# Patient Record
Sex: FEMALE | Race: WHITE
[De-identification: names, ages, dates, MRNs, and addresses within clinical notes are randomized per-mention and may not be internally consistent; named-entity substitution may affect disease eponyms.]

---

## 2018-02-23 ENCOUNTER — HOSPITAL ENCOUNTER (EMERGENCY)
Dept: HOSPITAL 26 - MED | Age: 23
Discharge: HOME | End: 2018-02-23
Payer: COMMERCIAL

## 2018-02-23 VITALS — DIASTOLIC BLOOD PRESSURE: 95 MMHG | SYSTOLIC BLOOD PRESSURE: 126 MMHG

## 2018-02-23 VITALS — BODY MASS INDEX: 26.11 KG/M2 | WEIGHT: 133 LBS | HEIGHT: 60 IN

## 2018-02-23 VITALS — SYSTOLIC BLOOD PRESSURE: 17 MMHG | DIASTOLIC BLOOD PRESSURE: 63 MMHG

## 2018-02-23 DIAGNOSIS — L25.9: Primary | ICD-10-CM

## 2018-02-23 NOTE — NUR
Patient discharged with v/s stable. Written and verbal after care instructions 
given and explained. 

Patient alert, oriented and verbalized understanding of instructions. 
Ambulatory with steady gait. All questions addressed prior to discharge. ID 
band removed. Patient advised to follow up with PMD. Rx of Hydrocortizone cream 
given. Patient educated on indication of medication including possible reaction 
and side effects. Opportunity to ask questions provided and answered.

## 2018-02-23 NOTE — NUR
PATIENT PRESENTS TO ED WITH C/O RASH ON  LT SIDE OF HER ABD;PT STATES HER 
BOYFREIND HAS A SHINGLES AND SHE JUST WANT TO BE CHECK IF SHE'S OKAY".DENIES 
N/V/D; SKIN IS PINK/WARM/DRY; AAOX4 WITH EVEN AND STEADY GAIT; LUNGS CLEAR BL; 
HR EVEN AND REGULAR; PT DENIES ANY FEVER, CP, SOB, OR COUGH AT THIS TIME; 
PATIENT STATES PAIN OF 1/10 AT THIS TIME;PATIENT POSITIONED FOR COMFORT; ER MD 
MADE AWARE OF PT STATUS.

## 2019-10-10 ENCOUNTER — HOSPITAL ENCOUNTER (EMERGENCY)
Dept: HOSPITAL 26 - MED | Age: 24
Discharge: HOME | End: 2019-10-10
Payer: COMMERCIAL

## 2019-10-10 VITALS — SYSTOLIC BLOOD PRESSURE: 137 MMHG | DIASTOLIC BLOOD PRESSURE: 81 MMHG

## 2019-10-10 VITALS — HEIGHT: 60 IN | WEIGHT: 140 LBS | BODY MASS INDEX: 27.48 KG/M2

## 2019-10-10 VITALS — DIASTOLIC BLOOD PRESSURE: 95 MMHG | SYSTOLIC BLOOD PRESSURE: 137 MMHG

## 2019-10-10 DIAGNOSIS — O23.41: Primary | ICD-10-CM

## 2019-10-10 DIAGNOSIS — O21.0: ICD-10-CM

## 2019-10-10 DIAGNOSIS — Z3A.01: ICD-10-CM

## 2019-10-10 LAB
ANION GAP SERPL CALCULATED.3IONS-SCNC: 23.1 MMOL/L (ref 8–16)
APPEARANCE UR: CLEAR
BASOPHILS # BLD AUTO: 0 K/UL (ref 0–0.22)
BASOPHILS NFR BLD AUTO: 0.3 % (ref 0–2)
BILIRUB UR QL STRIP: (no result)
BUN SERPL-MCNC: 9 MG/DL (ref 7–18)
CHLORIDE SERPL-SCNC: 99 MMOL/L (ref 98–107)
CO2 SERPL-SCNC: 20 MMOL/L (ref 21–32)
COLOR UR: YELLOW
CREAT SERPL-MCNC: 0.7 MG/DL (ref 0.6–1.3)
EOSINOPHIL # BLD AUTO: 0 K/UL (ref 0–0.4)
EOSINOPHIL NFR BLD AUTO: 0.1 % (ref 0–4)
ERYTHROCYTE [DISTWIDTH] IN BLOOD BY AUTOMATED COUNT: 12.9 % (ref 11.6–13.7)
GFR SERPL CREATININE-BSD FRML MDRD: 131 ML/MIN (ref 90–?)
GLUCOSE SERPL-MCNC: 82 MG/DL (ref 74–106)
GLUCOSE UR STRIP-MCNC: NEGATIVE MG/DL
HCT VFR BLD AUTO: 44.4 % (ref 36–48)
HGB BLD-MCNC: 14.7 G/DL (ref 12–16)
HGB UR QL STRIP: NEGATIVE
LEUKOCYTE ESTERASE UR QL STRIP: (no result)
LYMPHOCYTES # BLD AUTO: 1.6 K/UL (ref 2.5–16.5)
LYMPHOCYTES NFR BLD AUTO: 10.6 % (ref 20.5–51.1)
MCH RBC QN AUTO: 29 PG (ref 27–31)
MCHC RBC AUTO-ENTMCNC: 33 G/DL (ref 33–37)
MCV RBC AUTO: 88.8 FL (ref 80–94)
MONOCYTES # BLD AUTO: 0.5 K/UL (ref 0.8–1)
MONOCYTES NFR BLD AUTO: 3.7 % (ref 1.7–9.3)
NEUTROPHILS # BLD AUTO: 12.5 K/UL (ref 1.8–7.7)
NEUTROPHILS NFR BLD AUTO: 85.3 % (ref 42.2–75.2)
NITRITE UR QL STRIP: NEGATIVE
PH UR STRIP: 6 [PH] (ref 5–9)
PLATELET # BLD AUTO: 284 K/UL (ref 140–450)
POTASSIUM SERPL-SCNC: 4.1 MMOL/L (ref 3.5–5.1)
RBC # BLD AUTO: 5 MIL/UL (ref 4.2–5.4)
RBC #/AREA URNS HPF: (no result) /HPF (ref 0–5)
SODIUM SERPL-SCNC: 138 MMOL/L (ref 136–145)
WBC # BLD AUTO: 14.7 K/UL (ref 4.8–10.8)

## 2019-10-10 PROCEDURE — 96375 TX/PRO/DX INJ NEW DRUG ADDON: CPT

## 2019-10-10 PROCEDURE — 85025 COMPLETE CBC W/AUTO DIFF WBC: CPT

## 2019-10-10 PROCEDURE — 80048 BASIC METABOLIC PNL TOTAL CA: CPT

## 2019-10-10 PROCEDURE — 99284 EMERGENCY DEPT VISIT MOD MDM: CPT

## 2019-10-10 PROCEDURE — 87086 URINE CULTURE/COLONY COUNT: CPT

## 2019-10-10 PROCEDURE — 76801 OB US < 14 WKS SINGLE FETUS: CPT

## 2019-10-10 PROCEDURE — 36415 COLL VENOUS BLD VENIPUNCTURE: CPT

## 2019-10-10 PROCEDURE — 86901 BLOOD TYPING SEROLOGIC RH(D): CPT

## 2019-10-10 PROCEDURE — 96374 THER/PROPH/DIAG INJ IV PUSH: CPT

## 2019-10-10 PROCEDURE — 84702 CHORIONIC GONADOTROPIN TEST: CPT

## 2019-10-10 PROCEDURE — 81001 URINALYSIS AUTO W/SCOPE: CPT

## 2019-10-10 PROCEDURE — 86900 BLOOD TYPING SEROLOGIC ABO: CPT

## 2019-10-10 NOTE — NUR
pt bib freind to n/v since last two days. pt is 24F  c/o constant n/v x 2 
days.  per pt, she Has not been able to keep down water since last two days. No 
pain, chills or fever at this time. denies any vomiting. pt seen by er md. 
First pregnancy at 7 weeks per patient. will continue to monitor pt.



Hx- none

Rx- none

NKA

## 2019-10-10 NOTE — NUR
Patient discharged with v/s stable. Written and verbal after care instructions 
given and explained. 

Patient alert, oriented and verbalized understanding of instructions. 
Ambulatory with steady gait. All questions addressed prior to discharge. ID 
band removed. Patient advised to follow up with PMD. Rx of MACROBID, DICLEGIS 
given. Patient educated on indication of medication including possible reaction 
and side effects. Opportunity to ask questions provided and answered.

## 2019-10-14 ENCOUNTER — HOSPITAL ENCOUNTER (INPATIENT)
Dept: HOSPITAL 26 - MED | Age: 24
LOS: 2 days | Discharge: HOME | DRG: 832 | End: 2019-10-16
Attending: FAMILY MEDICINE | Admitting: FAMILY MEDICINE
Payer: COMMERCIAL

## 2019-10-14 VITALS — HEIGHT: 60 IN | BODY MASS INDEX: 25.91 KG/M2 | WEIGHT: 132 LBS

## 2019-10-14 VITALS — DIASTOLIC BLOOD PRESSURE: 93 MMHG | SYSTOLIC BLOOD PRESSURE: 130 MMHG

## 2019-10-14 DIAGNOSIS — Z3A.01: ICD-10-CM

## 2019-10-14 DIAGNOSIS — O99.281: ICD-10-CM

## 2019-10-14 DIAGNOSIS — E83.42: ICD-10-CM

## 2019-10-14 DIAGNOSIS — E86.0: ICD-10-CM

## 2019-10-14 DIAGNOSIS — R74.0: ICD-10-CM

## 2019-10-14 DIAGNOSIS — O21.1: Primary | ICD-10-CM

## 2019-10-14 DIAGNOSIS — Z82.49: ICD-10-CM

## 2019-10-14 DIAGNOSIS — E72.20: ICD-10-CM

## 2019-10-14 DIAGNOSIS — O23.41: ICD-10-CM

## 2019-10-14 DIAGNOSIS — E78.5: ICD-10-CM

## 2019-10-14 LAB
ALBUMIN FLD-MCNC: 4.8 G/DL (ref 3.4–5)
AMYLASE SERPL-CCNC: 80 U/L (ref 25–115)
ANION GAP SERPL CALCULATED.3IONS-SCNC: 26.7 MMOL/L (ref 8–16)
APPEARANCE UR: CLEAR
AST SERPL-CCNC: 18 U/L (ref 15–37)
BASOPHILS # BLD AUTO: 0.1 K/UL (ref 0–0.22)
BASOPHILS NFR BLD AUTO: 0.7 % (ref 0–2)
BILIRUB SERPL-MCNC: 1.1 MG/DL (ref 0–1)
BILIRUB UR QL STRIP: (no result)
BUN SERPL-MCNC: 19 MG/DL (ref 7–18)
CHLORIDE SERPL-SCNC: 98 MMOL/L (ref 98–107)
CHOLEST/HDLC SERPL: 5.2 {RATIO} (ref 1–4.5)
CO2 SERPL-SCNC: 15.8 MMOL/L (ref 21–32)
COLOR UR: YELLOW
CREAT SERPL-MCNC: 0.9 MG/DL (ref 0.6–1.3)
EOSINOPHIL # BLD AUTO: 0 K/UL (ref 0–0.4)
EOSINOPHIL NFR BLD AUTO: 0.1 % (ref 0–4)
ERYTHROCYTE [DISTWIDTH] IN BLOOD BY AUTOMATED COUNT: 13 % (ref 11.6–13.7)
GFR SERPL CREATININE-BSD FRML MDRD: 98 ML/MIN (ref 90–?)
GLUCOSE SERPL-MCNC: 88 MG/DL (ref 74–106)
GLUCOSE UR STRIP-MCNC: NEGATIVE MG/DL
HCT VFR BLD AUTO: 47.1 % (ref 36–48)
HDLC SERPL-MCNC: 46 MG/DL (ref 40–60)
HGB BLD-MCNC: 15.8 G/DL (ref 12–16)
HGB UR QL STRIP: (no result)
LDLC SERPL CALC-MCNC: 172 MG/DL (ref 60–100)
LEUKOCYTE ESTERASE UR QL STRIP: (no result)
LYMPHOCYTES # BLD AUTO: 1.3 K/UL (ref 2.5–16.5)
LYMPHOCYTES NFR BLD AUTO: 9 % (ref 20.5–51.1)
MAGNESIUM SERPL-MCNC: 1.5 MG/DL (ref 1.8–2.4)
MCH RBC QN AUTO: 30 PG (ref 27–31)
MCHC RBC AUTO-ENTMCNC: 34 G/DL (ref 33–37)
MCV RBC AUTO: 88.2 FL (ref 80–94)
MONOCYTES # BLD AUTO: 0.9 K/UL (ref 0.8–1)
MONOCYTES NFR BLD AUTO: 6.1 % (ref 1.7–9.3)
NEUTROPHILS # BLD AUTO: 12.2 K/UL (ref 1.8–7.7)
NEUTROPHILS NFR BLD AUTO: 84.1 % (ref 42.2–75.2)
NITRITE UR QL STRIP: NEGATIVE
PH UR STRIP: 6 [PH] (ref 5–9)
PHOSPHATE SERPL-MCNC: 4.2 MG/DL (ref 2.5–4.9)
PLATELET # BLD AUTO: 283 K/UL (ref 140–450)
POTASSIUM SERPL-SCNC: 3.5 MMOL/L (ref 3.5–5.1)
PROTHROMBIN TIME: 10.5 SECS (ref 10.8–13.4)
RBC # BLD AUTO: 5.34 MIL/UL (ref 4.2–5.4)
RBC #/AREA URNS HPF: (no result) /HPF (ref 0–5)
SODIUM SERPL-SCNC: 137 MMOL/L (ref 136–145)
T4 FREE SERPL-MCNC: 1.38 NG/DL (ref 0.76–1.46)
TRIGL SERPL-MCNC: 100 MG/DL (ref 30–150)
TSH SERPL DL<=0.05 MIU/L-ACNC: 0.41 UIU/ML (ref 0.34–3.74)
WBC # BLD AUTO: 14.5 K/UL (ref 4.8–10.8)
WBC,URINE: (no result) /HPF (ref 0–5)

## 2019-10-14 NOTE — NUR
23 Y/O F PRESENTS TO ER C/O N/V X 1 WEEK. PT IS 7 WEEKS PREGNANT. PT IS 
RECEIVING PRENATAL CARE. DENIES VAGINAL BLEEDING. PT HAS VAGINAL DISCHARGE, 
WHITE, THICK. PT WAS SEEN HERE AT [facility] LAST THURSDAY AND WAS PRESCRIBED 
[place] MED AND ANTIBIOTICS. PT UNABLE TO HOLD DOWN ANY MEDICATION. PT 
DENIES DIARRHEA. PT DENIES PAIN. PAIN LEVEL 0/10. NKA. NO MED HX. SAFETY 
MEASURES IN PLACE. WAITING FOR ERMD TO EVALUATE PT.

## 2019-10-14 NOTE — NUR
ADMITTED THIS 24 YEAR OLD FEMALE FROM ER PER LIS WITH CC OF N/V X1 WEEK, AMBULATED TO BED 
WITH STEADY GAIT, VITAL SIGNS STABLE, DENIES ANY N/V AT THIS TIME, ORIENTED TO ROOM AND CALL 
LIGHT, IVF OF LR FROM ER INFUSING AS WIDE OPEN, PLAN OF CARE DISCUSSED, INSTRUCTED NPO AT 
THIS TIME, SAFETY MEASURES IN PLACE, CALL LIGHT WITHIN REACH.

## 2019-10-14 NOTE — NUR
Patient will be admitted to care of Dr. Smith. Admited to Telemetry.  Will go to 
[id number]. Belongings list completed.  Report to TOYA Izaguirre.

## 2019-10-14 NOTE — NUR
25 Y/O F PRESENTS TO ER C/O N/V X 1 WEEK. PT IS 7 WEEKS PREGNANT. PT IS 
RECEIVING PRENATAL CARE. DENIES VAGINAL BLEEDING. PT HAS VAGINAL DISCHARGE, 
WHITE, THICK. PT WAS SEEN HERE AT [facility] LAST THURSDAY AND WAS PRESCRIBED 
[employer] MED AND ANTIBIOTICS FOR UTI. PT UNABLE TO HOLD DOWN ANY MEDICATION. 
PT DENIES DIARRHEA. PT DENIES PAIN. PAIN LEVEL 0/10. NKA. NO MED HX. SAFETY 
MEASURES IN PLACE. WAITING FOR ERMD TO EVALUATE PT.

## 2019-10-15 VITALS — SYSTOLIC BLOOD PRESSURE: 113 MMHG | DIASTOLIC BLOOD PRESSURE: 57 MMHG

## 2019-10-15 VITALS — SYSTOLIC BLOOD PRESSURE: 115 MMHG | DIASTOLIC BLOOD PRESSURE: 74 MMHG

## 2019-10-15 VITALS — DIASTOLIC BLOOD PRESSURE: 70 MMHG | SYSTOLIC BLOOD PRESSURE: 103 MMHG

## 2019-10-15 VITALS — SYSTOLIC BLOOD PRESSURE: 109 MMHG | DIASTOLIC BLOOD PRESSURE: 65 MMHG

## 2019-10-15 VITALS — SYSTOLIC BLOOD PRESSURE: 113 MMHG | DIASTOLIC BLOOD PRESSURE: 71 MMHG

## 2019-10-15 VITALS — SYSTOLIC BLOOD PRESSURE: 108 MMHG | DIASTOLIC BLOOD PRESSURE: 72 MMHG

## 2019-10-15 LAB
ANION GAP SERPL CALCULATED.3IONS-SCNC: 14 MMOL/L (ref 8–16)
BASOPHILS # BLD AUTO: 0.1 K/UL (ref 0–0.22)
BASOPHILS NFR BLD AUTO: 0.6 % (ref 0–2)
BUN SERPL-MCNC: 11 MG/DL (ref 7–18)
CHLORIDE SERPL-SCNC: 106 MMOL/L (ref 98–107)
CO2 SERPL-SCNC: 21.2 MMOL/L (ref 21–32)
CREAT SERPL-MCNC: 0.7 MG/DL (ref 0.6–1.3)
EOSINOPHIL # BLD AUTO: 0.1 K/UL (ref 0–0.4)
EOSINOPHIL NFR BLD AUTO: 0.7 % (ref 0–4)
ERYTHROCYTE [DISTWIDTH] IN BLOOD BY AUTOMATED COUNT: 12.7 % (ref 11.6–13.7)
GFR SERPL CREATININE-BSD FRML MDRD: 131 ML/MIN (ref 90–?)
GLUCOSE SERPL-MCNC: 131 MG/DL (ref 74–106)
HCT VFR BLD AUTO: 36.4 % (ref 36–48)
HGB BLD-MCNC: 12.3 G/DL (ref 12–16)
LYMPHOCYTES # BLD AUTO: 2.2 K/UL (ref 2.5–16.5)
LYMPHOCYTES NFR BLD AUTO: 20.5 % (ref 20.5–51.1)
MAGNESIUM SERPL-MCNC: 1.9 MG/DL (ref 1.8–2.4)
MCH RBC QN AUTO: 30 PG (ref 27–31)
MCHC RBC AUTO-ENTMCNC: 34 G/DL (ref 33–37)
MCV RBC AUTO: 88.7 FL (ref 80–94)
MONOCYTES # BLD AUTO: 1.2 K/UL (ref 0.8–1)
MONOCYTES NFR BLD AUTO: 10.7 % (ref 1.7–9.3)
NEUTROPHILS # BLD AUTO: 7.3 K/UL (ref 1.8–7.7)
NEUTROPHILS NFR BLD AUTO: 67.5 % (ref 42.2–75.2)
PHOSPHATE SERPL-MCNC: 2.5 MG/DL (ref 2.5–4.9)
PLATELET # BLD AUTO: 209 K/UL (ref 140–450)
POTASSIUM SERPL-SCNC: 3.2 MMOL/L (ref 3.5–5.1)
RBC # BLD AUTO: 4.1 MIL/UL (ref 4.2–5.4)
SODIUM SERPL-SCNC: 138 MMOL/L (ref 136–145)
WBC # BLD AUTO: 10.9 K/UL (ref 4.8–10.8)

## 2019-10-15 RX ADMIN — ONDANSETRON PRN MG: 4 TABLET, ORALLY DISINTEGRATING ORAL at 17:28

## 2019-10-15 RX ADMIN — Medication SCH EACH: at 09:09

## 2019-10-15 RX ADMIN — SODIUM CHLORIDE SCH MLS/HR: 0.45 INJECTION, SOLUTION INTRAVENOUS at 09:05

## 2019-10-15 RX ADMIN — Medication SCH TAB: at 09:09

## 2019-10-15 NOTE — NUR
Dr. Rankin at bedside to assess pt. Ultrasound tech at bedside as well. Pt resting in bed, 
no signs of distress. Call light within reach.

## 2019-10-15 NOTE — NUR
Pt resting in bed, watching TV, no signs of distress, no c/o discomfort. Sister at bedside 
visiting pt. Pt verbalized she felt slightly nauseated about an hour earlier but says "it 
went away on its own." No nausea at this time. Left AC IV intact with ongoing 1/2 NS @ 
60ml/h. Call light within reach.

## 2019-10-15 NOTE — NUR
INITIAL ASSESSMENT DONE. VITALS WERE TAKEN. PATIENT IN STABLE CONDITION. NO DISTRESS NOTED. 
WILL CONTINUE TO MONITOR.

## 2019-10-15 NOTE — NUR
Received bedside report from pm nurse Dmitriy. Pt resting in bed, aaox4, no c/o discomfort, no 
signs of distress. Left AC IV intact with ongoing D5 1/2 NS @ 60ml/h. Ice chips provided per 
pt request. Call light within reach.

## 2019-10-15 NOTE — NUR
ONE TIME DOSE OF MAG RIDER AND LACTULOSE ADMINISTERED WITH EDUCATION PROVIDED, TOLERATED 
WELL, PT REQUESTING FOR ICE CHIPS, DR CHAVIRA STATED OK FOR PT TO HAVE SOME ICE CHIPS, ALL 
NEEDS ATTENDED.

## 2019-10-15 NOTE — NUR
PT SLEEPING, EASILY AROUSABLE, VITAL SIGNS STABLE, DENIES ANY N/V OR PAIN, IVF INFUSING 
WELL, MAINTAIN ON NPO, MONITORED CLOSELY.

## 2019-10-15 NOTE — NUR
Received bedside report from pm nurse Alli. Pt resting in bed, aaox4, no signs of distress, 
no c/o discomfort. Left AC IV intact with ongoing NS @ 130ml/h. Bed alarm on. Call light 
within reach.

-------------------------------------------------------------------------------

Addendum: 10/15/19 at 0809 by Gracy Whaley RN

-------------------------------------------------------------------------------

Charted on wrong pt. Pls omit above note.

## 2019-10-15 NOTE — NUR
Pt c/o mild nausea, no episode of vomiting. Zofran administered & ice chips provided. Left 
AC IV intact with ongoing D5 1/2NS @ 60ml/h. Call light within reach.

## 2019-10-15 NOTE — NUR
10/15/19 RD INITIAL ASSESSMENT COMPLETED



PLEASE REFER TO NUTRITION ASSESSMENT UNDER CARE ACTIVITY FOR ESTIMATED NUTRITIONAL NEEDS. 



1. CONTINUE CLEAR LIQUID DIET AS TOLERATED 

2. IF/WHEN MEDIALLY STABLE TO BEGIN NUTRITION, ADVANCE DIET AS TOLERATED TO REGULAR DIET 

3. NUTRITION EDUCATION ON A CHOLESTEROL LOWERING DIET  AND MORNING SICKNESS WERE GIVEN TO 
PATIENT 

4. RD TO FOLLOW-UP 2-3 DAYS, HIGH RISK 



YAHIR LUCAS, RD

## 2019-10-15 NOTE — NUR
Pt sitting up in bed, eating dinner. No c/o nausea, no episode of vomiting. Able to eloisa 
regular texture food well. Family at bedside.

## 2019-10-15 NOTE — NUR
PT SLEEPING, EASILY AROUSABLE, VITAL SIGNS STABLE, DENIES N/V OR PAIN, IVF FROM ER OF D5 1/2 
NS +20KCL AT 150ML/H INFUSING WELL, CONTINUE TO MONITOR CLOSELY.

## 2019-10-15 NOTE — NUR
RECEIVED BEDSIDE REPORT FROM DAY SHIFT NURSE. PATIENT IS AWAKE AND COOPERATIVE. RESPIRATION 
EVEN UNLABORED ON ROOM AIR. NO DISTRESS NOTED. SKIN IS WARM AND DRY. IV PATENT AND INTACT. 
PLAN OF CARE WAS DISCUSSED. FAMILY AT BEDSIDE. ALL SAFETY MEASURES IN PLACE. BED IS AT LOW 
POSITION. CALL LIGHT WITHIN REACH AND VERBALIZES ITS USE. WILL CONTINUE TO MONITOR.

## 2019-10-15 NOTE — NUR
PATIENT COMPLAINED OF FEELING NAUSEOUS. PRN REGLAN ADMINISTERED PER ORDER. WILL CONTINUE TO 
MONITOR.

## 2019-10-15 NOTE — NUR
PATIENT HAS BEEN SCREENED AND CATEGORIZED AS HIGH NUTRITION RISK. PATIENT WILL BE SEEN 
WITHIN 1-2 DAYS OF ADMISSION.



10/15/19-10/16/19



YAHIR LUCAS RD

## 2019-10-16 VITALS — DIASTOLIC BLOOD PRESSURE: 61 MMHG | SYSTOLIC BLOOD PRESSURE: 102 MMHG

## 2019-10-16 VITALS — DIASTOLIC BLOOD PRESSURE: 63 MMHG | SYSTOLIC BLOOD PRESSURE: 112 MMHG

## 2019-10-16 VITALS — DIASTOLIC BLOOD PRESSURE: 59 MMHG | SYSTOLIC BLOOD PRESSURE: 102 MMHG

## 2019-10-16 VITALS — SYSTOLIC BLOOD PRESSURE: 112 MMHG | DIASTOLIC BLOOD PRESSURE: 63 MMHG

## 2019-10-16 VITALS — SYSTOLIC BLOOD PRESSURE: 101 MMHG | DIASTOLIC BLOOD PRESSURE: 71 MMHG

## 2019-10-16 VITALS — SYSTOLIC BLOOD PRESSURE: 112 MMHG | DIASTOLIC BLOOD PRESSURE: 68 MMHG

## 2019-10-16 LAB
ALBUMIN FLD-MCNC: 3.2 G/DL (ref 3.4–5)
ANION GAP SERPL CALCULATED.3IONS-SCNC: 10.7 MMOL/L (ref 8–16)
ANION GAP SERPL CALCULATED.3IONS-SCNC: 11.3 MMOL/L (ref 8–16)
AST SERPL-CCNC: 15 U/L (ref 15–37)
BASOPHILS # BLD AUTO: 0.1 K/UL (ref 0–0.22)
BASOPHILS # BLD AUTO: 0.1 K/UL (ref 0–0.22)
BASOPHILS NFR BLD AUTO: 0.7 % (ref 0–2)
BASOPHILS NFR BLD AUTO: 0.8 % (ref 0–2)
BILIRUB SERPL-MCNC: 0.7 MG/DL (ref 0–1)
BUN SERPL-MCNC: 3 MG/DL (ref 7–18)
BUN SERPL-MCNC: 4 MG/DL (ref 7–18)
CHLORIDE SERPL-SCNC: 103 MMOL/L (ref 98–107)
CHLORIDE SERPL-SCNC: 105 MMOL/L (ref 98–107)
CO2 SERPL-SCNC: 23.8 MMOL/L (ref 21–32)
CO2 SERPL-SCNC: 25.9 MMOL/L (ref 21–32)
CREAT SERPL-MCNC: 0.5 MG/DL (ref 0.6–1.3)
CREAT SERPL-MCNC: 0.6 MG/DL (ref 0.6–1.3)
EOSINOPHIL # BLD AUTO: 0.1 K/UL (ref 0–0.4)
EOSINOPHIL # BLD AUTO: 0.1 K/UL (ref 0–0.4)
EOSINOPHIL NFR BLD AUTO: 0.7 % (ref 0–4)
EOSINOPHIL NFR BLD AUTO: 1.3 % (ref 0–4)
ERYTHROCYTE [DISTWIDTH] IN BLOOD BY AUTOMATED COUNT: 13.1 % (ref 11.6–13.7)
ERYTHROCYTE [DISTWIDTH] IN BLOOD BY AUTOMATED COUNT: 13.1 % (ref 11.6–13.7)
GFR SERPL CREATININE-BSD FRML MDRD: 157 ML/MIN (ref 90–?)
GFR SERPL CREATININE-BSD FRML MDRD: 193 ML/MIN (ref 90–?)
GLUCOSE SERPL-MCNC: 103 MG/DL (ref 74–106)
GLUCOSE SERPL-MCNC: 73 MG/DL (ref 74–106)
HCT VFR BLD AUTO: 34.7 % (ref 36–48)
HCT VFR BLD AUTO: 36.3 % (ref 36–48)
HGB BLD-MCNC: 11.5 G/DL (ref 12–16)
HGB BLD-MCNC: 12 G/DL (ref 12–16)
LYMPHOCYTES # BLD AUTO: 2.1 K/UL (ref 2.5–16.5)
LYMPHOCYTES # BLD AUTO: 2.4 K/UL (ref 2.5–16.5)
LYMPHOCYTES NFR BLD AUTO: 22.6 % (ref 20.5–51.1)
LYMPHOCYTES NFR BLD AUTO: 27.6 % (ref 20.5–51.1)
MAGNESIUM SERPL-MCNC: 1.4 MG/DL (ref 1.8–2.4)
MCH RBC QN AUTO: 30 PG (ref 27–31)
MCH RBC QN AUTO: 30 PG (ref 27–31)
MCHC RBC AUTO-ENTMCNC: 33 G/DL (ref 33–37)
MCHC RBC AUTO-ENTMCNC: 33 G/DL (ref 33–37)
MCV RBC AUTO: 89 FL (ref 80–94)
MCV RBC AUTO: 89.6 FL (ref 80–94)
MONOCYTES # BLD AUTO: 0.7 K/UL (ref 0.8–1)
MONOCYTES # BLD AUTO: 0.8 K/UL (ref 0.8–1)
MONOCYTES NFR BLD AUTO: 7.3 % (ref 1.7–9.3)
MONOCYTES NFR BLD AUTO: 9 % (ref 1.7–9.3)
NEUTROPHILS # BLD AUTO: 5.4 K/UL (ref 1.8–7.7)
NEUTROPHILS # BLD AUTO: 6.2 K/UL (ref 1.8–7.7)
NEUTROPHILS NFR BLD AUTO: 61.3 % (ref 42.2–75.2)
NEUTROPHILS NFR BLD AUTO: 68.7 % (ref 42.2–75.2)
PHOSPHATE SERPL-MCNC: 3 MG/DL (ref 2.5–4.9)
PLATELET # BLD AUTO: 181 K/UL (ref 140–450)
PLATELET # BLD AUTO: 192 K/UL (ref 140–450)
POTASSIUM SERPL-SCNC: 3.1 MMOL/L (ref 3.5–5.1)
POTASSIUM SERPL-SCNC: 3.6 MMOL/L (ref 3.5–5.1)
RBC # BLD AUTO: 3.87 MIL/UL (ref 4.2–5.4)
RBC # BLD AUTO: 4.08 MIL/UL (ref 4.2–5.4)
SODIUM SERPL-SCNC: 136 MMOL/L (ref 136–145)
SODIUM SERPL-SCNC: 137 MMOL/L (ref 136–145)
T3RU NFR SERPL: 27 % (ref 24–39)
T4 SERPL-MCNC: 10.8 UG/DL (ref 4.5–12)
WBC # BLD AUTO: 8.8 K/UL (ref 4.8–10.8)
WBC # BLD AUTO: 9.1 K/UL (ref 4.8–10.8)

## 2019-10-16 RX ADMIN — POTASSIUM CHLORIDE SCH MEQ: 750 TABLET, FILM COATED, EXTENDED RELEASE ORAL at 17:53

## 2019-10-16 RX ADMIN — Medication SCH EACH: at 08:11

## 2019-10-16 RX ADMIN — SODIUM CHLORIDE SCH MLS/HR: 0.45 INJECTION, SOLUTION INTRAVENOUS at 06:53

## 2019-10-16 RX ADMIN — POTASSIUM CHLORIDE SCH MEQ: 750 TABLET, FILM COATED, EXTENDED RELEASE ORAL at 08:22

## 2019-10-16 RX ADMIN — ONDANSETRON PRN MG: 4 TABLET, ORALLY DISINTEGRATING ORAL at 17:53

## 2019-10-16 RX ADMIN — Medication SCH TAB: at 08:11

## 2019-10-16 RX ADMIN — ONDANSETRON PRN MG: 4 TABLET, ORALLY DISINTEGRATING ORAL at 09:40

## 2019-10-16 RX ADMIN — SODIUM CHLORIDE SCH MLS/HR: 0.45 INJECTION, SOLUTION INTRAVENOUS at 01:45

## 2019-10-16 RX ADMIN — POTASSIUM CHLORIDE SCH MEQ: 750 TABLET, FILM COATED, EXTENDED RELEASE ORAL at 13:31

## 2019-10-16 NOTE — NUR
pt given discharge instructions with packet. Pt verbalized understanding regarding follow-up 
with Dr Rankin and Dr Smith. 22 g iv to r ac removed catheter intact. all belonging with pt

## 2019-10-16 NOTE — NUR
PT RECEIVED ORDERED MAG  MG FOR MG 1.4 AND ORDERED K-DUR FOR POTASSIUM 3.1. PT IN BED 
AAOX4. PT DENIES NAUSEA AT THIS TIME. NO SIGNS OF ACUTE DISTRESS AT THIS TIME. BREATHING 
EVEN AND UNLABORED ON ROM AIR. WILL CONTINUE TO ASSESS FOR CHANGES IN CONDITION.

## 2019-10-16 NOTE — NUR
PT REPORTED iv WAS LEAKING. PT IV FLUSHED AND LEAKED AT INSERTION SITE. 20 G IV TO L AC 
REMOVED, CATHETER INTACT. WILL REESTABLISH IV SITE.

## 2019-10-16 NOTE — NUR
PT IN BED, VISITOR AT BEDSIDE. PT DENIES NAUSEA. IV INFUSING 1/2 NS AT 60 ML/HR, SITE 
ASYMPTOMATIC. PT REMINDED HOW CALL LIGHT WORKS. NO SIGNS OF ACUTE DISTRESS AT THIS TIME. 
WILL CONTINUE TO ASSESS FOR CHANGES IN CONDITION.

## 2019-10-16 NOTE — NUR
PT IN BED. AAOX4. BREATHING EVEN AND UNLABORED ON ROOM AIR. PT DENIES NAUSEA AT THIS TIME. 
PT RECEIVED ORDERED MORNING MEDICATIONS AND TOLERATED THEM WELL. 20 G IV TO R AC RUNNING 1/2 
NA AT 60 ML/HR IS PATENT AND SITE IS ASYMPTOMATIC. PT BREAKFAST AT BEDSIDE AND PT WILL EAT. 
WILL CONTINUE TO ASSESS PT FOR CHANGES IN CONDITION. NO SIGNS OF ACUTE DISTRESS AT THIS 
TIME.

## 2019-10-16 NOTE — NUR
22 G IV TO R FOREARM PLACED, SECOND ATTEMPT.  1/5 NS AT 60ML/HR RESUMED. WILL CONTINUE TO 
MONITOR PATIENT.

## 2019-10-16 NOTE — NUR
PT RECEIVED FROM NIGHT RN. PT SLEEPING IN BED, BREATHING EVEN AND UNLABORED ON ROOM AIR. NO 
SIGNS OF ACUTE DISTRESS AT THIS TIME. WILL CONTINUE CARE.

## 2019-10-16 NOTE — NUR
PT REPORTED NAUSEA. ORDERED SUBLINGUAL ZOFRAN 4MG ADMINISTERED. WILL CONTINUE TO ASSESS PT 
FOR EFFECTIVENESS OF MEDICATION.

## 2019-10-25 ENCOUNTER — HOSPITAL ENCOUNTER (EMERGENCY)
Dept: HOSPITAL 26 - MED | Age: 24
Discharge: HOME | End: 2019-10-25
Payer: COMMERCIAL

## 2019-10-25 VITALS — WEIGHT: 128 LBS | BODY MASS INDEX: 25.13 KG/M2 | HEIGHT: 60 IN

## 2019-10-25 VITALS — SYSTOLIC BLOOD PRESSURE: 135 MMHG | DIASTOLIC BLOOD PRESSURE: 87 MMHG

## 2019-10-25 VITALS — SYSTOLIC BLOOD PRESSURE: 123 MMHG | DIASTOLIC BLOOD PRESSURE: 81 MMHG

## 2019-10-25 DIAGNOSIS — O21.8: Primary | ICD-10-CM

## 2019-10-25 DIAGNOSIS — Z79.2: ICD-10-CM

## 2019-10-25 DIAGNOSIS — Z3A.08: ICD-10-CM

## 2019-10-25 DIAGNOSIS — Z79.899: ICD-10-CM

## 2019-10-25 LAB
ALBUMIN FLD-MCNC: 4.2 G/DL (ref 3.4–5)
ANION GAP SERPL CALCULATED.3IONS-SCNC: 26.6 MMOL/L (ref 8–16)
APPEARANCE UR: (no result)
AST SERPL-CCNC: 22 U/L (ref 15–37)
BASOPHILS # BLD AUTO: 0 K/UL (ref 0–0.22)
BASOPHILS NFR BLD AUTO: 0.4 % (ref 0–2)
BILIRUB SERPL-MCNC: 0.6 MG/DL (ref 0–1)
BILIRUB UR QL STRIP: NEGATIVE
BUN SERPL-MCNC: 8 MG/DL (ref 7–18)
CHLORIDE SERPL-SCNC: 100 MMOL/L (ref 98–107)
CO2 SERPL-SCNC: 15.4 MMOL/L (ref 21–32)
COLOR UR: YELLOW
CREAT SERPL-MCNC: 0.7 MG/DL (ref 0.6–1.3)
EOSINOPHIL # BLD AUTO: 0.1 K/UL (ref 0–0.4)
EOSINOPHIL NFR BLD AUTO: 0.7 % (ref 0–4)
ERYTHROCYTE [DISTWIDTH] IN BLOOD BY AUTOMATED COUNT: 13.3 % (ref 11.6–13.7)
GFR SERPL CREATININE-BSD FRML MDRD: 131 ML/MIN (ref 90–?)
GLUCOSE SERPL-MCNC: 68 MG/DL (ref 74–106)
GLUCOSE UR STRIP-MCNC: NEGATIVE MG/DL
HCT VFR BLD AUTO: 42.6 % (ref 36–48)
HGB BLD-MCNC: 14.1 G/DL (ref 12–16)
HGB UR QL STRIP: (no result)
LEUKOCYTE ESTERASE UR QL STRIP: (no result)
LYMPHOCYTES # BLD AUTO: 1.1 K/UL (ref 2.5–16.5)
LYMPHOCYTES NFR BLD AUTO: 12.5 % (ref 20.5–51.1)
MCH RBC QN AUTO: 30 PG (ref 27–31)
MCHC RBC AUTO-ENTMCNC: 33 G/DL (ref 33–37)
MCV RBC AUTO: 89.6 FL (ref 80–94)
MONOCYTES # BLD AUTO: 0.7 K/UL (ref 0.8–1)
MONOCYTES NFR BLD AUTO: 7.2 % (ref 1.7–9.3)
NEUTROPHILS # BLD AUTO: 7.1 K/UL (ref 1.8–7.7)
NEUTROPHILS NFR BLD AUTO: 79.2 % (ref 42.2–75.2)
NITRITE UR QL STRIP: NEGATIVE
PH UR STRIP: 6 [PH] (ref 5–9)
PLATELET # BLD AUTO: 257 K/UL (ref 140–450)
POTASSIUM SERPL-SCNC: 4 MMOL/L (ref 3.5–5.1)
RBC # BLD AUTO: 4.75 MIL/UL (ref 4.2–5.4)
RBC #/AREA URNS HPF: (no result) /HPF (ref 0–5)
SODIUM SERPL-SCNC: 138 MMOL/L (ref 136–145)
WBC # BLD AUTO: 9 K/UL (ref 4.8–10.8)

## 2019-10-25 PROCEDURE — 96361 HYDRATE IV INFUSION ADD-ON: CPT

## 2019-10-25 PROCEDURE — 81025 URINE PREGNANCY TEST: CPT

## 2019-10-25 PROCEDURE — 76817 TRANSVAGINAL US OBSTETRIC: CPT

## 2019-10-25 PROCEDURE — 86901 BLOOD TYPING SEROLOGIC RH(D): CPT

## 2019-10-25 PROCEDURE — 85025 COMPLETE CBC W/AUTO DIFF WBC: CPT

## 2019-10-25 PROCEDURE — 87086 URINE CULTURE/COLONY COUNT: CPT

## 2019-10-25 PROCEDURE — 80053 COMPREHEN METABOLIC PANEL: CPT

## 2019-10-25 PROCEDURE — 36415 COLL VENOUS BLD VENIPUNCTURE: CPT

## 2019-10-25 PROCEDURE — 96360 HYDRATION IV INFUSION INIT: CPT

## 2019-10-25 PROCEDURE — 86900 BLOOD TYPING SEROLOGIC ABO: CPT

## 2019-10-25 PROCEDURE — 84702 CHORIONIC GONADOTROPIN TEST: CPT

## 2019-10-25 PROCEDURE — 99284 EMERGENCY DEPT VISIT MOD MDM: CPT

## 2019-10-25 PROCEDURE — 81001 URINALYSIS AUTO W/SCOPE: CPT

## 2019-10-25 RX ADMIN — SODIUM CHLORIDE ONE MLS/HR: 9 INJECTION, SOLUTION INTRAVENOUS at 15:45

## 2019-10-25 RX ADMIN — SODIUM CHLORIDE ONE MLS/HR: 9 INJECTION, SOLUTION INTRAVENOUS at 16:21

## 2019-10-25 NOTE — NUR
C/O N/V X2-3 WEEKS. PT REPORTS BEING APPROX. 8 WEEKS PREG. DENIES VAGINAL 
BLEEDING/ABD PAIN. PT IS TACHYCARDIC  BPM.A/O X4 FOLLOWS COMMANDS; 
BREATHING UNLABORED AND SYMMETRICAL. PATIENT STATES THAT SHE HAS NAUSEA AND 
VOMITING. ERMD MADE AWARE. SIDE [id number]. PLACED ON MONITOR



HX: NONE

RX: NONE

## 2019-10-25 NOTE — NUR
Patient discharged with v/s stable. Written and verbal after care instructions 
given and explained. 

Patient alert, oriented and verbalized understanding of instructions. 
Ambulatory with steady gait. All questions addressed prior to discharge. ID 
band removed. Patient advised to follow up with OB/GYN. Rx of ZOFRAN given. 
Patient educated on indication of medication including possible reaction and 
side effects. Opportunity to ask questions provided and answered.

PT GIVEN COPY OF LAB AND US RESULTS TO TAKE TO OB/GYN

## 2019-10-30 ENCOUNTER — HOSPITAL ENCOUNTER (INPATIENT)
Dept: HOSPITAL 4 - SED | Age: 24
LOS: 8 days | Discharge: HOME | DRG: 833 | End: 2019-11-07
Attending: SPECIALIST | Admitting: SPECIALIST
Payer: COMMERCIAL

## 2019-10-30 VITALS — WEIGHT: 125 LBS | SYSTOLIC BLOOD PRESSURE: 119 MMHG | HEIGHT: 60 IN | BODY MASS INDEX: 24.54 KG/M2

## 2019-10-30 VITALS — SYSTOLIC BLOOD PRESSURE: 118 MMHG

## 2019-10-30 DIAGNOSIS — Z3A.09: ICD-10-CM

## 2019-10-30 DIAGNOSIS — O21.0: Primary | ICD-10-CM

## 2019-10-30 LAB
ALBUMIN SERPL BCP-MCNC: 4 G/DL (ref 3.4–4.8)
ALT SERPL W P-5'-P-CCNC: 137 U/L (ref 12–78)
ANION GAP SERPL CALCULATED.3IONS-SCNC: 16 MMOL/L (ref 5–15)
AST SERPL W P-5'-P-CCNC: 85 U/L (ref 10–37)
BASOPHILS # BLD AUTO: 0.1 K/UL (ref 0–0.2)
BASOPHILS NFR BLD AUTO: 0.6 % (ref 0–2)
BILIRUB SERPL-MCNC: 1.5 MG/DL (ref 0–1)
BUN SERPL-MCNC: 11 MG/DL (ref 8–21)
CALCIUM SERPL-MCNC: 9.4 MG/DL (ref 8.4–11)
CHLORIDE SERPL-SCNC: 97 MMOL/L (ref 98–107)
CREAT SERPL-MCNC: 0.65 MG/DL (ref 0.55–1.3)
EOSINOPHIL # BLD AUTO: 0.1 K/UL (ref 0–0.4)
EOSINOPHIL NFR BLD AUTO: 0.5 % (ref 0–4)
ERYTHROCYTE [DISTWIDTH] IN BLOOD BY AUTOMATED COUNT: 13.6 % (ref 9–15)
GFR SERPL CREATININE-BSD FRML MDRD: 143 ML/MIN (ref 90–?)
GLUCOSE SERPL-MCNC: 84 MG/DL (ref 70–99)
HCT VFR BLD AUTO: 42 % (ref 36–48)
HGB BLD-MCNC: 14.6 G/DL (ref 12–16)
LYMPHOCYTES # BLD AUTO: 1.4 K/UL (ref 1–5.5)
LYMPHOCYTES NFR BLD AUTO: 14.6 % (ref 20.5–51.5)
MCH RBC QN AUTO: 31 PG (ref 27–31)
MCHC RBC AUTO-ENTMCNC: 35 % (ref 32–36)
MCV RBC AUTO: 89 FL (ref 79–98)
MONOCYTES # BLD MANUAL: 0.7 K/UL (ref 0–1)
MONOCYTES # BLD MANUAL: 7.5 % (ref 1.7–9.3)
NEUTROPHILS # BLD AUTO: 7.5 K/UL (ref 1.8–7.7)
NEUTROPHILS NFR BLD AUTO: 76.8 % (ref 40–70)
PLATELET # BLD AUTO: 264 K/UL (ref 130–430)
POTASSIUM SERPL-SCNC: 2.7 MMOL/L (ref 3.5–5.1)
RBC # BLD AUTO: 4.74 MIL/UL (ref 4.2–6.2)
SODIUM SERPLBLD-SCNC: 131 MMOL/L (ref 136–145)
WBC # BLD AUTO: 9.8 K/UL (ref 4.8–10.8)

## 2019-10-30 PROCEDURE — C9113 INJ PANTOPRAZOLE SODIUM, VIA: HCPCS

## 2019-10-30 RX ADMIN — DEXTROSE AND SODIUM CHLORIDE SCH MLS/HR: 5; 450 INJECTION, SOLUTION INTRAVENOUS at 19:50

## 2019-10-30 RX ADMIN — ONDANSETRON PRN MG: 2 INJECTION INTRAMUSCULAR; INTRAVENOUS at 22:48

## 2019-10-30 NOTE — NUR
Pt c/o n/v and states she is 9 weeks pregnant and had also recently been 
getting IVs and fluids. Pt states no pain at this time. No other complaints or 
injuries per pt or noted.

## 2019-10-30 NOTE — NUR
Patient will be admitted to care of Dr. Silvestre.  Admitted to OB unit.  Will 
go to room 6.  Belongings list completed.  Summary report printed. Report will 
be given at bedside.

## 2019-10-31 RX ADMIN — ONDANSETRON PRN MG: 2 INJECTION INTRAMUSCULAR; INTRAVENOUS at 15:56

## 2019-10-31 RX ADMIN — ONDANSETRON PRN MG: 2 INJECTION INTRAMUSCULAR; INTRAVENOUS at 20:19

## 2019-10-31 RX ADMIN — ONDANSETRON PRN MG: 2 INJECTION INTRAMUSCULAR; INTRAVENOUS at 04:13

## 2019-10-31 RX ADMIN — DEXTROSE AND SODIUM CHLORIDE SCH MLS/HR: 5; 450 INJECTION, SOLUTION INTRAVENOUS at 05:30

## 2019-10-31 RX ADMIN — DEXTROSE AND SODIUM CHLORIDE SCH MLS/HR: 5; 450 INJECTION, SOLUTION INTRAVENOUS at 00:30

## 2019-11-01 VITALS — SYSTOLIC BLOOD PRESSURE: 107 MMHG

## 2019-11-01 LAB
ALBUMIN SERPL BCP-MCNC: 2.7 G/DL (ref 3.4–4.8)
ALT SERPL W P-5'-P-CCNC: 156 U/L (ref 12–78)
ANION GAP SERPL CALCULATED.3IONS-SCNC: 4 MMOL/L (ref 5–15)
AST SERPL W P-5'-P-CCNC: 94 U/L (ref 10–37)
BILIRUB SERPL-MCNC: 0.8 MG/DL (ref 0–1)
BUN SERPL-MCNC: 2 MG/DL (ref 8–21)
CALCIUM SERPL-MCNC: 7.8 MG/DL (ref 8.4–11)
CHLORIDE SERPL-SCNC: 109 MMOL/L (ref 98–107)
CREAT SERPL-MCNC: 0.54 MG/DL (ref 0.55–1.3)
GFR SERPL CREATININE-BSD FRML MDRD: 177 ML/MIN (ref 90–?)
GLUCOSE SERPL-MCNC: 111 MG/DL (ref 70–99)
POTASSIUM SERPL-SCNC: 3.3 MMOL/L (ref 3.5–5.1)
SODIUM SERPLBLD-SCNC: 138 MMOL/L (ref 136–145)

## 2019-11-01 RX ADMIN — ONDANSETRON PRN MG: 2 INJECTION INTRAMUSCULAR; INTRAVENOUS at 18:11

## 2019-11-01 RX ADMIN — PROCHLORPERAZINE EDISYLATE PRN MG: 5 INJECTION INTRAMUSCULAR; INTRAVENOUS at 13:19

## 2019-11-01 RX ADMIN — SUCRALFATE SCH GM: 1 TABLET ORAL at 18:11

## 2019-11-01 RX ADMIN — DEXTROSE, SODIUM CHLORIDE, AND POTASSIUM CHLORIDE SCH MLS/HR: 5; .9; .15 INJECTION INTRAVENOUS at 13:26

## 2019-11-01 RX ADMIN — SUCRALFATE SCH GM: 1 TABLET ORAL at 22:41

## 2019-11-01 RX ADMIN — ONDANSETRON PRN MG: 2 INJECTION INTRAMUSCULAR; INTRAVENOUS at 21:25

## 2019-11-01 RX ADMIN — ONDANSETRON PRN MG: 2 INJECTION INTRAMUSCULAR; INTRAVENOUS at 08:08

## 2019-11-01 RX ADMIN — ONDANSETRON PRN MG: 2 INJECTION INTRAMUSCULAR; INTRAVENOUS at 12:50

## 2019-11-01 RX ADMIN — DEXTROSE, SODIUM CHLORIDE, AND POTASSIUM CHLORIDE SCH MLS/HR: 5; .9; .15 INJECTION INTRAVENOUS at 03:42

## 2019-11-01 NOTE — NUR
Closing Notes

Patient remain to be alert, awake and verbally responsive. Denies any pain or discomfort at 
this time. IV line in place and intact. IVF infusing well. Call light within the reach. Bed 
alarm on , bed at lowest position. Will endorse to the next shift.

## 2019-11-01 NOTE — NUR
ADMISSION NOTE

Received patient from OB via wheelchair, received report from  RN. Patient admitted with 
diagnosis of nausea and vomiting. Patient oriented to room call light, toileting and 
safety-patient verbalized understanding.

## 2019-11-01 NOTE — NUR
ROUNDS

PATIENT RESTING COMFORTABLY IN BED, VITALS STABLE, DENIES ANY PAIN AT THIS TIME NO NAUSEA 
AND VOMITING. ASSESSMENT DONE AND DOCUMENTED. SEE FLOWSHEET. NEEDS ATTENDED TO. SAFETY AND 
FALL PRECAUTION MEASURES IN PLACED. CALL LIGHT PLACED WITHIN REACH.

## 2019-11-01 NOTE — NUR
RN ROUNDS

Patient remain to be alert, awake and verbally responsive. Denies any pain or discomfort at 
this time. No nausea or vomiting. RP at bedside. Call light within the reach.

## 2019-11-02 VITALS — SYSTOLIC BLOOD PRESSURE: 121 MMHG

## 2019-11-02 VITALS — SYSTOLIC BLOOD PRESSURE: 107 MMHG

## 2019-11-02 VITALS — SYSTOLIC BLOOD PRESSURE: 94 MMHG

## 2019-11-02 VITALS — SYSTOLIC BLOOD PRESSURE: 112 MMHG

## 2019-11-02 LAB
ALBUMIN SERPL BCP-MCNC: 2.8 G/DL (ref 3.4–4.8)
ALT SERPL W P-5'-P-CCNC: 214 U/L (ref 12–78)
ANION GAP SERPL CALCULATED.3IONS-SCNC: 6 MMOL/L (ref 5–15)
AST SERPL W P-5'-P-CCNC: 148 U/L (ref 10–37)
BASOPHILS # BLD AUTO: 0 K/UL (ref 0–0.2)
BASOPHILS NFR BLD AUTO: 0.6 % (ref 0–2)
BILIRUB SERPL-MCNC: 0.9 MG/DL (ref 0–1)
BUN SERPL-MCNC: 2 MG/DL (ref 8–21)
CALCIUM SERPL-MCNC: 8.5 MG/DL (ref 8.4–11)
CHLORIDE SERPL-SCNC: 104 MMOL/L (ref 98–107)
CREAT SERPL-MCNC: 0.55 MG/DL (ref 0.55–1.3)
EOSINOPHIL # BLD AUTO: 0 K/UL (ref 0–0.4)
EOSINOPHIL NFR BLD AUTO: 0.7 % (ref 0–4)
ERYTHROCYTE [DISTWIDTH] IN BLOOD BY AUTOMATED COUNT: 13.7 % (ref 9–15)
GFR SERPL CREATININE-BSD FRML MDRD: 173 ML/MIN (ref 90–?)
GLUCOSE SERPL-MCNC: 101 MG/DL (ref 70–99)
HCT VFR BLD AUTO: 32.2 % (ref 36–48)
HGB BLD-MCNC: 11.3 G/DL (ref 12–16)
LIPASE SERPL-CCNC: 337 U/L (ref 73–393)
LYMPHOCYTES # BLD AUTO: 1.6 K/UL (ref 1–5.5)
LYMPHOCYTES NFR BLD AUTO: 26.2 % (ref 20.5–51.5)
MCH RBC QN AUTO: 31 PG (ref 27–31)
MCHC RBC AUTO-ENTMCNC: 35 % (ref 32–36)
MCV RBC AUTO: 88 FL (ref 79–98)
MONOCYTES # BLD MANUAL: 0.5 K/UL (ref 0–1)
MONOCYTES # BLD MANUAL: 8.1 % (ref 1.7–9.3)
NEUTROPHILS # BLD AUTO: 4 K/UL (ref 1.8–7.7)
NEUTROPHILS NFR BLD AUTO: 64.4 % (ref 40–70)
PHOSPHATE SERPL-MCNC: 1.8 MG/DL (ref 2.7–4.5)
PLATELET # BLD AUTO: 159 K/UL (ref 130–430)
POTASSIUM SERPL-SCNC: 3.2 MMOL/L (ref 3.5–5.1)
RBC # BLD AUTO: 3.67 MIL/UL (ref 4.2–6.2)
SODIUM SERPLBLD-SCNC: 137 MMOL/L (ref 136–145)
TIBC SERPL-MCNC: 194 UG/DL (ref 250–450)
TRIGL SERPL-MCNC: 61 MG/DL (ref 30–150)
WBC # BLD AUTO: 6.3 K/UL (ref 4.8–10.8)

## 2019-11-02 RX ADMIN — SODIUM CHLORIDE SCH MG: 9 INJECTION, SOLUTION INTRAVENOUS at 09:48

## 2019-11-02 RX ADMIN — SUCRALFATE SCH GM: 1 TABLET ORAL at 11:34

## 2019-11-02 RX ADMIN — DEXTROSE AND SODIUM CHLORIDE SCH MLS/HR: 5; 900 INJECTION, SOLUTION INTRAVENOUS at 16:45

## 2019-11-02 RX ADMIN — SUCRALFATE SCH GM: 1 TABLET ORAL at 16:46

## 2019-11-02 RX ADMIN — I.V. FAT EMULSION SCH MLS/HR: 20 EMULSION INTRAVENOUS at 20:34

## 2019-11-02 RX ADMIN — ONDANSETRON PRN MG: 2 INJECTION INTRAMUSCULAR; INTRAVENOUS at 16:46

## 2019-11-02 RX ADMIN — SUCRALFATE SCH GM: 1 TABLET ORAL at 20:32

## 2019-11-02 RX ADMIN — DEXTROSE AND SODIUM CHLORIDE SCH MLS/HR: 5; 900 INJECTION, SOLUTION INTRAVENOUS at 00:57

## 2019-11-02 RX ADMIN — SUCRALFATE SCH GM: 1 TABLET ORAL at 06:40

## 2019-11-02 NOTE — NUR
CLOSING NOTES

PATIENT AWAKE, VITALS STABLE, NO COMPLAINTS AT THIS TIME. ALL NEEDS ATTENDED TO. SAFETY 
MEASURES MAINTAINED. CALL LIGHT PLACED WITHIN REACH.

## 2019-11-02 NOTE — NUR
Dietitian recommendation



   1. Recommend: continuing Clear liquid diet.

   2. Recommend increasing infusion rate of PPN: D20% AA 8.5% at 80ml/hr (goal rate) via 
peripheral line. To provide: 979 kcal, 82 gm protein and GIR 2.34 gm CHO/kg/min.

   3. If PO intake does not improve, consider PICC line to better meet nutrient needs. 



PLease see Nutritional Assessment for details.

detention, RD

## 2019-11-02 NOTE — NUR
am rounds:

Patient sleeping during rounds. Significant other at the bedside. No acute distress. Call 
light iwth in reach. Bed locked at  lowest position.

## 2019-11-02 NOTE — NUR
ROUNDS

PATIENT RESTING COMFORTABLY IN BED, NOT IN DISTRESS, VITALS STABLE, DENIES ANY PAIN AND 
DISCOMFORT AT THIS TIME. ASSESSMENT DONE AND DOCUMENTED. SEE FLOWSHEET. NEEDS ATTENDED TO. 
SAFETY AND FALL PRECAUTION MEASURES IN PLACED. BED IN LOW AND LOCKED POSITION. CALL LIGHT 
PLACED WITHIN REACH.

## 2019-11-02 NOTE — NUR
Closing Notes:

No nausea/vomiting this time. No acute distress. gi did rounds,ordered mri mrcp,will 
endorsed to night nurse to verify order due to no mri during weekends. Call light with in 
reach. Bed locked at lowest position.

## 2019-11-02 NOTE — NUR
Nutrition Assessment (short note d/t lack of time)



PMH: N/V, Hyperemesis Gravidarum, Elevated Liver Enzymes



A- RD reviewed pt's current EMR including diet Hx, physician notes, nursing notes, pertinent 
labs/meds/procedures, care trends and care activity. 

    Nutrition Support trigger received. Pt seen resting in bed at time of RD visit. Pt 
reported of going slow w/ diet, but was able to tolerate jello and some fruit juice from 
breakfast tray. Last BM was yesterday. Pt reported that she has been on liquid diet at home 
for 3 weeks now, and has not been able to keep any liquid down for the most part. She vomits 
5-8 times per day and has lost 20# in 2months. UBW was 145# before pregnancy. Pt denied any 
food allergy. Current TPN support provides: 514 kcal, 43 gm protein and GIR 1.77gm 
CHO/kg/min. Pt is not yet meeting adequate nutrition and was advised to improve PO intake. 
Also RD rec to Peyton Bender to increase PPN infusion rate. 



Ht: 5'0

Wt: 125#/ 57 kg

BMI: 24.4 kg/m2 (Normal)

%IBW: 125# 

IBW: 100#/ 45kg



ESTIMATED NUTRITIONAL REQUIREMENTS

CALORIES/DAY: 1638 kcal/day (BEE x 1.2-1.5 CBW for pregnancy)

PROTEIN/DAY: 71-82 gm/day (0.8-1gm/kg CBW +25gm/day for pregnancy)

FLUID/DAY: 1.6-2L/day (1ml/calorie for repletion)



D: 1. Malnutrition r/t chronic illness 2/2 complications of pregnancy AEB poor PO intake, 
14% severe weight loss in 2 months.

    2. Inadequate PO intake r/t altered GI function AEB pt's report of N/V, intolerance to 
solid and liquid food, and need for nutrition support/TPN.



I: 1. Recommend: continuing Clear liquid diet.

   2. Recommend increasing infusion rate of PPN: D20% AA 8.5% at 80ml/hr (goal rate) via 
peripheral line. To provide: 979 kcal, 82 gm protein and GIR 2.34 gm CHO/kg/min.

   3. If PO intake does not improve, consider PICC line to better meet nutrient needs. 





M: Monitor appetite and PO intake w/ goal of pt meeting at least 75% of estimated 
nutritional needs, labs trending WNL, normal GI function, skin integrity/wt maintenance.



E: RD to F/U within 2-3 days

BABATUNDE RD

## 2019-11-03 VITALS — SYSTOLIC BLOOD PRESSURE: 100 MMHG

## 2019-11-03 VITALS — SYSTOLIC BLOOD PRESSURE: 98 MMHG

## 2019-11-03 VITALS — SYSTOLIC BLOOD PRESSURE: 90 MMHG

## 2019-11-03 VITALS — SYSTOLIC BLOOD PRESSURE: 101 MMHG

## 2019-11-03 VITALS — SYSTOLIC BLOOD PRESSURE: 105 MMHG

## 2019-11-03 LAB
ALBUMIN SERPL BCP-MCNC: 2.6 G/DL (ref 3.4–4.8)
ALT SERPL W P-5'-P-CCNC: 194 U/L (ref 12–78)
ANION GAP SERPL CALCULATED.3IONS-SCNC: 4 MMOL/L (ref 5–15)
AST SERPL W P-5'-P-CCNC: 88 U/L (ref 10–37)
BILIRUB SERPL-MCNC: 0.4 MG/DL (ref 0–1)
BUN SERPL-MCNC: 4 MG/DL (ref 8–21)
CALCIUM SERPL-MCNC: 8.1 MG/DL (ref 8.4–11)
CHLORIDE SERPL-SCNC: 104 MMOL/L (ref 98–107)
CREAT SERPL-MCNC: 0.56 MG/DL (ref 0.55–1.3)
GFR SERPL CREATININE-BSD FRML MDRD: 170 ML/MIN (ref 90–?)
GLUCOSE SERPL-MCNC: 87 MG/DL (ref 70–99)
PHOSPHATE SERPL-MCNC: 2.3 MG/DL (ref 2.7–4.5)
POTASSIUM SERPL-SCNC: 3.5 MMOL/L (ref 3.5–5.1)
SODIUM SERPLBLD-SCNC: 136 MMOL/L (ref 136–145)

## 2019-11-03 RX ADMIN — SUCRALFATE SCH GM: 1 TABLET ORAL at 06:21

## 2019-11-03 RX ADMIN — SODIUM CHLORIDE SCH MG: 9 INJECTION, SOLUTION INTRAVENOUS at 09:31

## 2019-11-03 RX ADMIN — DEXTROSE AND SODIUM CHLORIDE SCH MLS/HR: 5; 900 INJECTION, SOLUTION INTRAVENOUS at 09:32

## 2019-11-03 RX ADMIN — SUCRALFATE SCH GM: 1 TABLET ORAL at 11:25

## 2019-11-03 RX ADMIN — SUCRALFATE SCH GM: 1 TABLET ORAL at 17:19

## 2019-11-03 RX ADMIN — I.V. FAT EMULSION SCH MLS/HR: 20 EMULSION INTRAVENOUS at 21:45

## 2019-11-03 RX ADMIN — ONDANSETRON PRN MG: 2 INJECTION INTRAMUSCULAR; INTRAVENOUS at 17:19

## 2019-11-03 RX ADMIN — ONDANSETRON PRN MG: 2 INJECTION INTRAMUSCULAR; INTRAVENOUS at 11:25

## 2019-11-03 RX ADMIN — SUCRALFATE SCH GM: 1 TABLET ORAL at 21:42

## 2019-11-03 NOTE — NUR
ROUNDS

PATIENT RESTING COMFORTABLY IN BED, VITALS STABLE, DENIES ANY NAUSEA/VOMITING AT THIS TIME. 
ASSESSMENT DONE AND DOCUMENTED. SEE FLOWSHEET. NEEDS ATTENDED TO. SAFETY AND FALL MEASURES 
IN PLACED. CALL LIGHT PLACED WITHIN REACH.

## 2019-11-03 NOTE — NUR
IV NOTES:

LEFT WRIST INFILTRATED,REMOVED AND GAUZE APPLIED,NO BLEEDING. IV RE SITED AT LEFT UPPER ARM 
USING G#22,IVF CONTINUE AS ORDERED.

## 2019-11-03 NOTE — NUR
END OF SHIFT:

PATIENT TOLERATED FULL LIQUID. NO COMPLAINED OF NAUSEA/VOMITING THIS TIME. PPN/LIPIDS 
RUNNING AT LEFT FOREARM AND IVF RUNNING WELL AT LEFT HAND INTACT.SAFETY MEASURES RENDERED. 
PRACTICE GUIDELINES MET THROUGH OUT THE SHIFT.

## 2019-11-03 NOTE — NUR
IV NOTES:

IV INFILTRATED. IV RE SITED AT LEFT HAND USING G#22,IV ZOFRAN GIVEN PRIOR TO DINNER.GI MD 
CAME AND ADVANCE DIET TO FULL LIQUID.

## 2019-11-03 NOTE — NUR
Am Rounds:

Patient awake on the bed. With significant other in the room. No acute distress. Call light 
with in reach. Bed locked at lowest position.

## 2019-11-04 VITALS — SYSTOLIC BLOOD PRESSURE: 105 MMHG

## 2019-11-04 VITALS — SYSTOLIC BLOOD PRESSURE: 108 MMHG

## 2019-11-04 VITALS — SYSTOLIC BLOOD PRESSURE: 99 MMHG

## 2019-11-04 VITALS — SYSTOLIC BLOOD PRESSURE: 97 MMHG

## 2019-11-04 VITALS — SYSTOLIC BLOOD PRESSURE: 113 MMHG

## 2019-11-04 VITALS — SYSTOLIC BLOOD PRESSURE: 111 MMHG

## 2019-11-04 LAB
ANION GAP SERPL CALCULATED.3IONS-SCNC: 6 MMOL/L (ref 5–15)
BUN SERPL-MCNC: 5 MG/DL (ref 8–21)
CALCIUM SERPL-MCNC: 8.1 MG/DL (ref 8.4–11)
CHLORIDE SERPL-SCNC: 103 MMOL/L (ref 98–107)
CREAT SERPL-MCNC: 0.58 MG/DL (ref 0.55–1.3)
FERRITIN: 134 NG/ML (ref 15–150)
GFR SERPL CREATININE-BSD FRML MDRD: 163 ML/MIN (ref 90–?)
GLUCOSE SERPL-MCNC: 101 MG/DL (ref 70–99)
PHOSPHATE SERPL-MCNC: 3.6 MG/DL (ref 2.7–4.5)
POTASSIUM SERPL-SCNC: 3.8 MMOL/L (ref 3.5–5.1)
SODIUM SERPLBLD-SCNC: 135 MMOL/L (ref 136–145)

## 2019-11-04 RX ADMIN — DEXTROSE AND SODIUM CHLORIDE SCH MLS/HR: 5; 900 INJECTION, SOLUTION INTRAVENOUS at 09:41

## 2019-11-04 RX ADMIN — DEXTROSE AND SODIUM CHLORIDE SCH MLS/HR: 5; 900 INJECTION, SOLUTION INTRAVENOUS at 05:35

## 2019-11-04 RX ADMIN — I.V. FAT EMULSION SCH MLS/HR: 20 EMULSION INTRAVENOUS at 21:36

## 2019-11-04 RX ADMIN — SUCRALFATE SCH GM: 1 TABLET ORAL at 16:58

## 2019-11-04 RX ADMIN — SUCRALFATE SCH GM: 1 TABLET ORAL at 21:33

## 2019-11-04 RX ADMIN — ONDANSETRON PRN MG: 2 INJECTION INTRAMUSCULAR; INTRAVENOUS at 08:13

## 2019-11-04 RX ADMIN — SUCRALFATE SCH GM: 1 TABLET ORAL at 11:59

## 2019-11-04 RX ADMIN — SODIUM CHLORIDE SCH MG: 9 INJECTION, SOLUTION INTRAVENOUS at 08:08

## 2019-11-04 RX ADMIN — SUCRALFATE SCH GM: 1 TABLET ORAL at 06:02

## 2019-11-04 NOTE — NUR
CLOSING NOTE



PATIENT IS STABLE. DENIES ANY PAIN. NO ACUTE DISTRESS. NO SOB. RESPIRATION EVEN AND 
UNLABORED. SKIN WARM AND DRY TO TOUCH. IVs INTACT AND PATENT; YARIEL TPN AND IVF. BED IN LOW 
AND LOCKED POSITION. SIDERAIL UPX2. ALL NEEDS MET. CONT TO MONITOR. CALL LIGHT IN REACH. 
WILL ENDORSE TO ONCOMING SHIFT.

## 2019-11-04 NOTE — NUR
SHOWER



PATIENT STABLE. IV SITE WRAPPED. PATIENT SHOWERED, ASSISTED BY MOTHER. NO DIFFICULTY NOTED.

## 2019-11-04 NOTE — NUR
MRI/MRCP



PATIENT SPOKE TO RADIOLOGIST REGARDING MRI/MRCP AND BEING PREGNANT. RADIOLOGIST DIDNT 
RECOMMEND PATIENT TO GO THROUGH WITH TESTING CONSIDERING SHE IS IN HER FIRST TRIMESTER 
UNLESS DX IS LIFE THREATENING TO HER. 



PER PATIENT SHE IS REFUSING MRI/MRCP AT THIS TIME.

## 2019-11-04 NOTE — NUR
RN NOTE:

PT RESTING IN BED. VSS. PT DENIES PAIN OR DISCOMFORT AT THIS TIME. IVF, TPN, AND LIPIDS ARE 
INFUSING AT ORDERED RATE. NO S/S OF DISTRESS. BREATHING IS UNLABORED TO ROOM AIR. SAFETY AND 
FALL PRECAUTIONS MAINTAINED. WILL MONITOR.

## 2019-11-04 NOTE — NUR
OPENING NOTE



PATIENT AWAKE. A/OX4. ROOM AIR. NO ACUTE DISTRESS. NO SOB. RESPIRATION EVEN AND UNLABORED. 
SKIN WARM AND DRY TO TOUCH. IV INTACT AND PATENT. PATIENT YARIEL IVF, TPN AND LIPIDS AS 
ORDERED. BED IN LOW AND LOCKED POSITION. SIDERAIL UPX3.  AT BEDSIDE. ALL NEEDS MET. 
CALL LIGHT IN REACH. CONT TO MONITOR

## 2019-11-04 NOTE — NUR
MEDICATION PASS

SCHEDULED CARAFATE GIVEN AS ORDERED. DUE TPN AND LIPIDS VERIFIED BY A SECOND NURSE PER 
PROTOCOL AND ADMINISTERED AS ORDERED. PT TOLERATED WELL. NO S/S OF DISTRESS. MEDICATION 
ACTIONS AND POTENTIAL SIDE EFFECTS DISCUSSED, PT VERBALIZED UNDERSTANDING. SAFETY 
PRECAUTIONS MAINTAINED. WILL MONITOR.

## 2019-11-04 NOTE — NUR
CLOSING NOTES

PATIENT AWAKE, VITALS STABLE, NO COMPLAINTS AT THIS TIME. ALL NEEDS ATTENDED TO. SAFETY 
MEASURES MAINTAINED. BED IN LOW AND LOCKED POSITION. CALL LIGHT PLACED WITHIN REACH.

## 2019-11-04 NOTE — NUR
OPENING NOTE

RECEIVED CARE OF PT AND SBAR REPORT. PT IS AWAKE AND RESTING IN BED. A/OX4. ROOM AIR. NO 
ACUTE DISTRESS. NO SOB. RESPIRATION EVEN AND UNLABORED. SKIN WARM AND DRY TO TOUCH. IV 
INTACT AND PATENT. PATIENT TOLERATING IVF, TPN AND LIPIDS AS ORDERED. PT DENIES PAIN OR 
DISCOMFORT. PT DENIES NAUSEA OR VOMITING AT THIS TIME. SAFETY PRECAUTIONS ARE IN PLACE: BED 
IN LOW AND LOCKED POSITION. SIDERAIL UPX3.CALL LIGHT IS WITH PT. PERSONAL ITEMS WITHIN 
REACH. WILL CONT TO MONITOR.

## 2019-11-04 NOTE — NUR
NOTE



MEDICATION ADMINISTERED AS ORDERED, YARIEL WELL. PATIENT IS STABLE. ALL NEEDS MET. CALL LIGHT 
IN REACH. CONT TO MONITOR

## 2019-11-04 NOTE — NUR
NOTE



PATIENT IS STABLE. SITTING IN BED WATCHING TV. NO ACUTE DISTRESS. NO SOB. RESPIRATION EVEN 
AND UNLABORED. YARIEL IVF, TPN AND LIPIDS AS ORDERED. ALL NEEDS MET. CALL LIGHT IN REACH. CONT 
TO MONITOR

## 2019-11-05 VITALS — SYSTOLIC BLOOD PRESSURE: 100 MMHG

## 2019-11-05 VITALS — SYSTOLIC BLOOD PRESSURE: 112 MMHG

## 2019-11-05 VITALS — SYSTOLIC BLOOD PRESSURE: 103 MMHG

## 2019-11-05 LAB
ALBUMIN SERPL BCP-MCNC: 2.8 G/DL (ref 3.4–4.8)
ALT SERPL W P-5'-P-CCNC: 143 U/L (ref 12–78)
ANION GAP SERPL CALCULATED.3IONS-SCNC: 4 MMOL/L (ref 5–15)
AST SERPL W P-5'-P-CCNC: 39 U/L (ref 10–37)
BASOPHILS # BLD AUTO: 0 K/UL (ref 0–0.2)
BASOPHILS NFR BLD AUTO: 0.7 % (ref 0–2)
BILIRUB SERPL-MCNC: 0.3 MG/DL (ref 0–1)
BUN SERPL-MCNC: 6 MG/DL (ref 8–21)
CALCIUM SERPL-MCNC: 8.4 MG/DL (ref 8.4–11)
CHLORIDE SERPL-SCNC: 103 MMOL/L (ref 98–107)
CREAT SERPL-MCNC: 0.51 MG/DL (ref 0.55–1.3)
EOSINOPHIL # BLD AUTO: 0.2 K/UL (ref 0–0.4)
EOSINOPHIL NFR BLD AUTO: 2.8 % (ref 0–4)
ERYTHROCYTE [DISTWIDTH] IN BLOOD BY AUTOMATED COUNT: 14.1 % (ref 9–15)
GFR SERPL CREATININE-BSD FRML MDRD: 189 ML/MIN (ref 90–?)
GLUCOSE SERPL-MCNC: 74 MG/DL (ref 70–99)
HCT VFR BLD AUTO: 32.5 % (ref 36–48)
HGB BLD-MCNC: 11.2 G/DL (ref 12–16)
LYMPHOCYTES # BLD AUTO: 1.9 K/UL (ref 1–5.5)
LYMPHOCYTES NFR BLD AUTO: 26.6 % (ref 20.5–51.5)
MCH RBC QN AUTO: 31 PG (ref 27–31)
MCHC RBC AUTO-ENTMCNC: 35 % (ref 32–36)
MCV RBC AUTO: 89 FL (ref 79–98)
MONOCYTES # BLD MANUAL: 0.6 K/UL (ref 0–1)
MONOCYTES # BLD MANUAL: 9.1 % (ref 1.7–9.3)
NEUTROPHILS # BLD AUTO: 4.3 K/UL (ref 1.8–7.7)
NEUTROPHILS NFR BLD AUTO: 60.8 % (ref 40–70)
PHOSPHATE SERPL-MCNC: 4.1 MG/DL (ref 2.7–4.5)
PLATELET # BLD AUTO: 138 K/UL (ref 130–430)
POTASSIUM SERPL-SCNC: 3.7 MMOL/L (ref 3.5–5.1)
RBC # BLD AUTO: 3.64 MIL/UL (ref 4.2–6.2)
SODIUM SERPLBLD-SCNC: 133 MMOL/L (ref 136–145)
WBC # BLD AUTO: 7.1 K/UL (ref 4.8–10.8)

## 2019-11-05 PROCEDURE — 3E0336Z INTRODUCTION OF NUTRITIONAL SUBSTANCE INTO PERIPHERAL VEIN, PERCUTANEOUS APPROACH: ICD-10-PCS | Performed by: SPECIALIST

## 2019-11-05 RX ADMIN — SODIUM CHLORIDE SCH MG: 9 INJECTION, SOLUTION INTRAVENOUS at 08:15

## 2019-11-05 RX ADMIN — SUCRALFATE SCH GM: 1 TABLET ORAL at 11:34

## 2019-11-05 RX ADMIN — SUCRALFATE SCH GM: 1 TABLET ORAL at 05:55

## 2019-11-05 RX ADMIN — Medication SCH EA: at 20:32

## 2019-11-05 RX ADMIN — I.V. FAT EMULSION SCH MLS/HR: 20 EMULSION INTRAVENOUS at 20:34

## 2019-11-05 RX ADMIN — SUCRALFATE SCH GM: 1 TABLET ORAL at 17:19

## 2019-11-05 RX ADMIN — Medication SCH EA: at 11:36

## 2019-11-05 RX ADMIN — Medication SCH EA: at 17:21

## 2019-11-05 RX ADMIN — DEXTROSE AND SODIUM CHLORIDE SCH MLS/HR: 5; 900 INJECTION, SOLUTION INTRAVENOUS at 05:55

## 2019-11-05 RX ADMIN — SUCRALFATE SCH GM: 1 TABLET ORAL at 20:30

## 2019-11-05 RX ADMIN — ONDANSETRON PRN MG: 2 INJECTION INTRAMUSCULAR; INTRAVENOUS at 07:09

## 2019-11-05 NOTE — NUR
PM SHIFT ASSESSMENT

Recieved patient lying in bed, aox4, vital signs stable, on room air, denies any nausea or 
vomiting at this time, tolerating soft low fiber diet. IV line to right and left hand intact 
and patent, no signs of infiltration noted, IVF, TPN and Lipids infusing well. Plan of care 
discussed with patient, patient verbalized understanding, compliant. Oriented to use call 
light for nurse assistance, fall and safety measures in place, will monitor.

## 2019-11-05 NOTE — NUR
RN Rounds

patient resting in bed, no acute distress noted, patient denies any nausea or vomiting, no 
acute distress noted.

## 2019-11-05 NOTE — NUR
MED PASS

Patient awake, denies any nausea or vomiting, due medications administered, educated on use 
and side effects of each medication, patient verbalized understanding, blood sugar check 
this pm of 85, no insulin coverage needed, significant other at bedside, call light within 
reach, will monitor.

## 2019-11-05 NOTE — NUR
RN Rounds

patient sitting up in bed eating dinner, patient tolerating diet well, patient denies any 
pain or nausea.

## 2019-11-05 NOTE — NUR
Dietitian Recommendations



   1. Recommend continuing soft (low fiber/bland) diet 

   2. Consider tapering off PPN support if PO intakes consistently exceed 75% at meal times

LP, RD



Please refer to Nutrition F/U for details.

## 2019-11-05 NOTE — NUR
Spoke with Physician

spoke with Dr. Silvestre, informed him that patient is on TPN, new orders received for 
fingerstick blood glucose checks and insulin orders, verified with read back.

## 2019-11-05 NOTE — NUR
Opening Note

received bedside SBAR report from night shift RN, patient resting in bed, respirations even 
and unlabored on room air, no acute distress noted, patient denies any pain, educated 
patient on use of call light and asked to call for assistance, patient verbalized 
understanding, call light in reach, educated patient on use of bed alarm for patient safety, 
patient refusing bed alarm, bed in low and locked position.

## 2019-11-05 NOTE — NUR
RN Rounds

patient resting in bed, patient tolerated breakfast well, patient reports nausea is 
controlled at this time, patient denies any pain.

## 2019-11-05 NOTE — NUR
RN ROUNDS

Patient awake, denies any pain or vomiting, IVF and TPN continues to infuse, significant 
other at bedside, call light within reach, will monitor.

## 2019-11-05 NOTE — NUR
SLEEPING

PT SLEEPING IN BED, NO S/S OF DISTRESS, BREATHING IS UNLABORED TO ROOM AIR WITH VISIBLE RISE 
AND FALL OF CHEST. SAFETY AND FALL PRECAUTIONS ARE IN PLACE. WILL MONITOR.

## 2019-11-05 NOTE — NUR
RN Rounds

patient sitting up in bed eating lunch, patient tolerating well, patient reports nausea is 
controlled at this time.

## 2019-11-05 NOTE — NUR
Closing Note

bedside SBAR report given to receiving RN, patient resting in bed, no acute distress noted, 
patient reports nausea and vomiting are controlled at this time, no acute distress noted, 
educated patient on use of call light and asked to call for assistance, patient verbalized 
understanding, call light in reach, educated patient on use of bed alarm for patient safety, 
patient refusing bed alarm, bed in low and locked position, bed alarm on, care endorsed to 
night shift RN.

## 2019-11-05 NOTE — NUR
Nutrition F/U



A- RD reviewed pt's current EMR including diet Hx, physician notes, nursing notes, pertinent 
labs/meds/procedures, care trends, and care activity. 



Admitting Dx: Hyperemesis gravidum



PMH: none



Current Diet order: Soft (low fiber/bland) x0 days

Current Nutrition Support: PPN D20%, AA8.5% at 65 ml/hr, IL20% at 5 ml/hr via peripheral 
line -- increased PPN rate to begin later today

Provides: 1034 kcal/day, 66 gm protein/day, 1560 ml total volume/day, and GIR: 1.9 gm 
CHO/kg/min

Meets: 63% of lower end of estimated caloric needs and 93% of lower end of estimated protein 
needs



Subjective Info: Pt seen resting in bed w/ lunch tray at bedside. Pt had begun eating 
chicken parmesan and reported good appetite. Pt reported that she wants to introduce flavor 
and food back into her diet slowly. Pt reported last BM was 2 days ago; no c/o N/V/C/D. Pt 
continues on PPN support -- appropriate route for nutrition at this time until pt increases 
PO intakes. RD offered nutrition education on general healthy pregnancy MNT -- pt accepted; 
RD to provide. Please refer to interdisciplinary teaching record for details.



Ht: 5'/60"  

Wt: 125#/57 kg     

BMI: 24.4 kg/m2 (Normal)

IBW: 100#/ 45kg

%IBW: 125# 

UBW: 145#/66kg

%UBW: 86%



ESTIMATED NUTRITIONAL REQUIREMENTS

NEW CALORIES/DAY: 9243-7541 kcal/day (BEE x 1.2-1.5 CBW for pregnancy)

NEW PROTEIN/DAY: 71-82 gm/day (0.8-1gm/kg CBW for pregnancy)

NEW FLUID/DAY: 1.6-2 L/day (1 ml/kcal/day for repletion)



D: 1. Malnutrition r/t chronic illness 2/2 complications of pregnancy AEB poor PO intake, 
14% severe weight loss in 2 months.

    2. Inadequate PO intake r/t altered GI function AEB pt's report of N/V, intolerance to 
solid and liquid food, and need for nutrition support/PPN.



I: 1. Recommend continuing soft (low fiber/bland) diet 

   2. Consider tapering off PPN support if PO intakes consistently exceed 75% at meal times



M: Monitor appetite and PO intake w/ goal of pt meeting at least 75% of estimated 
nutritional needs, labs trending WNL, normal GI function, skin integrity/wt maintenance.



E: RD to F/U within 2-3 days

## 2019-11-05 NOTE — NUR
CLOSING NOTES

PATIENT AWAKE, VITALS STABLE, NO COMPLAINTS AT THIS TIME. ALL NEEDS ATTENDED TO. SAFETY 
MEASURES MAINTAINED. BED IN LOW AND LOCKED POSITION. CALL LIGHT PLACED WITHIN REACH. CARE 
ENDORSED TO DAY SHIFT RN.

## 2019-11-05 NOTE — NUR
RESTING

PT RESTING IN BED WITH EYES CLOSED, NO S/S OF DISTRESS, BREATHING IS EVEN AND EFFORTLESS TO 
ROOM AIR, NO S/S OF PAIN. SAFETY PRECAUTIONS OBSERVED. WILL MONITOR.

## 2019-11-05 NOTE — NUR
RN Rounds

patient resting in bed, patient denies any pain, patient reports nausea is controlled at 
this time, IV infusing well, no redness or swelling noted at IV site.

## 2019-11-05 NOTE — NUR
IV RE-INSERTION:

Complaining of pain to IV site. Restarted on right wrist. Successful after 1 attempts. Will 
observe for any signs of infiltration.

## 2019-11-06 VITALS — SYSTOLIC BLOOD PRESSURE: 121 MMHG

## 2019-11-06 VITALS — SYSTOLIC BLOOD PRESSURE: 119 MMHG

## 2019-11-06 VITALS — SYSTOLIC BLOOD PRESSURE: 90 MMHG

## 2019-11-06 VITALS — SYSTOLIC BLOOD PRESSURE: 115 MMHG

## 2019-11-06 LAB
ALBUMIN SERPL BCP-MCNC: 2.8 G/DL (ref 3.4–4.8)
ALT SERPL W P-5'-P-CCNC: 159 U/L (ref 12–78)
ANION GAP SERPL CALCULATED.3IONS-SCNC: 5 MMOL/L (ref 5–15)
AST SERPL W P-5'-P-CCNC: 46 U/L (ref 10–37)
BASOPHILS # BLD AUTO: 0 K/UL (ref 0–0.2)
BASOPHILS NFR BLD AUTO: 0.7 % (ref 0–2)
BILIRUB SERPL-MCNC: 0.3 MG/DL (ref 0–1)
BUN SERPL-MCNC: 7 MG/DL (ref 8–21)
CALCIUM SERPL-MCNC: 8.2 MG/DL (ref 8.4–11)
CHLORIDE SERPL-SCNC: 103 MMOL/L (ref 98–107)
CREAT SERPL-MCNC: 0.59 MG/DL (ref 0.55–1.3)
EOSINOPHIL # BLD AUTO: 0.2 K/UL (ref 0–0.4)
EOSINOPHIL NFR BLD AUTO: 3 % (ref 0–4)
ERYTHROCYTE [DISTWIDTH] IN BLOOD BY AUTOMATED COUNT: 14 % (ref 9–15)
GFR SERPL CREATININE-BSD FRML MDRD: 160 ML/MIN (ref 90–?)
GLUCOSE SERPL-MCNC: 85 MG/DL (ref 70–99)
HCT VFR BLD AUTO: 33.4 % (ref 36–48)
HGB BLD-MCNC: 11.5 G/DL (ref 12–16)
LIPASE SERPL-CCNC: 470 U/L (ref 73–393)
LYMPHOCYTES # BLD AUTO: 1.6 K/UL (ref 1–5.5)
LYMPHOCYTES NFR BLD AUTO: 21.8 % (ref 20.5–51.5)
MCH RBC QN AUTO: 31 PG (ref 27–31)
MCHC RBC AUTO-ENTMCNC: 34 % (ref 32–36)
MCV RBC AUTO: 90 FL (ref 79–98)
MONOCYTES # BLD MANUAL: 0.7 K/UL (ref 0–1)
MONOCYTES # BLD MANUAL: 9.4 % (ref 1.7–9.3)
NEUTROPHILS # BLD AUTO: 4.7 K/UL (ref 1.8–7.7)
NEUTROPHILS NFR BLD AUTO: 65.1 % (ref 40–70)
PHOSPHATE SERPL-MCNC: 4.1 MG/DL (ref 2.7–4.5)
PLATELET # BLD AUTO: 143 K/UL (ref 130–430)
POTASSIUM SERPL-SCNC: 3.9 MMOL/L (ref 3.5–5.1)
RBC # BLD AUTO: 3.72 MIL/UL (ref 4.2–6.2)
SODIUM SERPLBLD-SCNC: 133 MMOL/L (ref 136–145)
WBC # BLD AUTO: 7.3 K/UL (ref 4.8–10.8)

## 2019-11-06 RX ADMIN — Medication SCH EA: at 06:00

## 2019-11-06 RX ADMIN — Medication SCH EA: at 20:14

## 2019-11-06 RX ADMIN — ONDANSETRON PRN MG: 2 INJECTION INTRAMUSCULAR; INTRAVENOUS at 18:25

## 2019-11-06 RX ADMIN — SUCRALFATE SCH GM: 1 TABLET ORAL at 11:48

## 2019-11-06 RX ADMIN — SUCRALFATE SCH GM: 1 TABLET ORAL at 19:50

## 2019-11-06 RX ADMIN — SUCRALFATE SCH GM: 1 TABLET ORAL at 05:56

## 2019-11-06 RX ADMIN — PROCHLORPERAZINE EDISYLATE PRN MG: 5 INJECTION INTRAMUSCULAR; INTRAVENOUS at 19:51

## 2019-11-06 RX ADMIN — SODIUM CHLORIDE SCH MG: 9 INJECTION, SOLUTION INTRAVENOUS at 08:35

## 2019-11-06 RX ADMIN — Medication SCH EA: at 16:53

## 2019-11-06 RX ADMIN — SUCRALFATE SCH GM: 1 TABLET ORAL at 16:53

## 2019-11-06 RX ADMIN — DEXTROSE AND SODIUM CHLORIDE SCH MLS/HR: 5; 900 INJECTION, SOLUTION INTRAVENOUS at 03:44

## 2019-11-06 RX ADMIN — Medication SCH EA: at 11:48

## 2019-11-06 RX ADMIN — ONDANSETRON PRN MG: 2 INJECTION INTRAMUSCULAR; INTRAVENOUS at 23:13

## 2019-11-06 NOTE — NUR
Opening Note

bedside SBAR report received from night shift RN, patient resting in bed, respirations even 
and unlabored on room air, no acute distress noted, patient reports nausea is controlled at 
this time, educated patient on use of call light and asked to call for assistance, patient 
verbalized understanding, call light in reach, educated patient on use of bed alarm for 
patient safety, patient refusing bed alarm, bed in low and locked position.

## 2019-11-06 NOTE — NUR
RN ROUNDS

Patient resting quietly, breathing even and unlabored, IVF continues to infuse, call light 
within reach, safety measures in place, will monitor.

## 2019-11-06 NOTE — NUR
TPN/Lipids

spoke with Pharmacist Josiah, per Pharmacist stop lipids at this time, TPN should be reduced 
to 32.5 ml/hr (half of the dose rate) for 3 hours and then stop TPN, informed patient, 
patient verbalized understanding.

## 2019-11-06 NOTE — NUR
RN ROUNDS

Patient resting quietly, breathing even and unlabored, vitals stable, significant other at 
bedside, call light within reach, safety measures in place, will monitor.

## 2019-11-06 NOTE — NUR
CLOSING NOTE/MED PASS

Patient awake, denies any nausea or vomiting, due medications administered, RE-educated on 
use and side effects of each medication, patient verbalized understanding, blood sugar check 
this am of 97, no insulin coverage needed, IV lines to left and right hand intact and 
patent, no signs of infiltration, IVF and TPN continues to infuse, significant other remains 
at bedside, call light within reach, will monitor until report given to am nurse.

## 2019-11-06 NOTE — NUR
NAUSEA

Patient c/o of nausea, medicated with zofran 4 mg IVP, will reassess patient's nausea 
shortly, ice chips provided.

## 2019-11-06 NOTE — NUR
PM SHIFT ASSESSMENT

Recieved patient lying in bed, aox4, vital signs stable, on room air, has slight nausea and 
vomited earlier, Zofran given by day nurse, will administer another nausea medication 
shortly, IV line to left hand intact and patent, IVF of D5NS infusing at 30 ml/hr, plan of 
care discussed with patient, patient verbalized understanding, compliant. Oriented to use 
call light for nurse assistance, fall and safety measures in place, will monitor.

## 2019-11-06 NOTE — NUR
RN ROUNDS

Patient sleeping, breathing is even and unlabored, IVF and TPN continues to infuse, call 
light within reach, safety measures in place, will monitor.

## 2019-11-06 NOTE — NUR
RN Rounds

patient resting in bed, patient tolerated lunch well, patient denies any nausea or vomiting.

## 2019-11-06 NOTE — NUR
RN Rounds

patient resting in bed, no acute distress noted, patient tolerating diet well, patient 
denies any nausea or vomiting.

## 2019-11-06 NOTE — NUR
MED PASS

Patient awake, due medications administered, educated on use and side effects of each 
medication, patient verbalized understanding, blood sugar check this pm of 90, no insulin 
coverage needed, call light within reach, will monitor.

## 2019-11-06 NOTE — NUR
RN Rounds

patient sitting up in bed eating lunch, tolerating well, patient denies any nausea or 
vomiting, no acute distress noted.

## 2019-11-06 NOTE — NUR
D/C TPN

TPN stopped at this time per orders, per patient IV site is sore, patient requesting to have 
IV catheter removed, IV catheter to right hand removed, catheter intact, no bleeding, IV 
catheter to left wrist clean, dry, intact and infusing fluids well, patient denies any 
nausea at this time.

## 2019-11-06 NOTE — NUR
Closing Note

bedside SBAR report given to receiving RN, patient resting in bed, no acute distress noted, 
educated patient on use of call light and asked to call for assistance, patient verbalized 
understanding, call light in reach, bed in low and locked position, bed alarm on, care 
endorsed to night shift RN.

## 2019-11-06 NOTE — NUR
Blood Glucose

blood glucose 72, educated patient on signs and symptoms of hypoglycemia and to report any 
symptoms to RN, patient verbalized understanding, provided patient with snack and orange 
juice, patient tolerating well.

## 2019-11-07 VITALS — SYSTOLIC BLOOD PRESSURE: 113 MMHG

## 2019-11-07 VITALS — SYSTOLIC BLOOD PRESSURE: 115 MMHG

## 2019-11-07 VITALS — SYSTOLIC BLOOD PRESSURE: 122 MMHG

## 2019-11-07 VITALS — SYSTOLIC BLOOD PRESSURE: 109 MMHG

## 2019-11-07 LAB
A1AT SERPL-MCNC: 129 MG/DL (ref 100–188)
ALBUMIN SERPL BCP-MCNC: 3 G/DL (ref 3.4–4.8)
ALT SERPL W P-5'-P-CCNC: 118 U/L (ref 12–78)
ANION GAP SERPL CALCULATED.3IONS-SCNC: 8 MMOL/L (ref 5–15)
AST SERPL W P-5'-P-CCNC: 28 U/L (ref 10–37)
BASOPHILS # BLD AUTO: 0.1 K/UL (ref 0–0.2)
BASOPHILS NFR BLD AUTO: 1 % (ref 0–2)
BILIRUB SERPL-MCNC: 0.3 MG/DL (ref 0–1)
BUN SERPL-MCNC: 4 MG/DL (ref 8–21)
CALCIUM SERPL-MCNC: 8.8 MG/DL (ref 8.4–11)
CERULOPLASMIN SERPL-MCNC: 20.4 MG/DL (ref 19–39)
CHLORIDE SERPL-SCNC: 102 MMOL/L (ref 98–107)
CREAT SERPL-MCNC: 0.53 MG/DL (ref 0.55–1.3)
EOSINOPHIL # BLD AUTO: 0.1 K/UL (ref 0–0.4)
EOSINOPHIL NFR BLD AUTO: 1.9 % (ref 0–4)
ERYTHROCYTE [DISTWIDTH] IN BLOOD BY AUTOMATED COUNT: 14 % (ref 9–15)
GFR SERPL CREATININE-BSD FRML MDRD: 181 ML/MIN (ref 90–?)
GLUCOSE SERPL-MCNC: 89 MG/DL (ref 70–99)
HCT VFR BLD AUTO: 32.4 % (ref 36–48)
HGB BLD-MCNC: 11 G/DL (ref 12–16)
LYMPHOCYTES # BLD AUTO: 1.3 K/UL (ref 1–5.5)
LYMPHOCYTES NFR BLD AUTO: 19.1 % (ref 20.5–51.5)
MCH RBC QN AUTO: 31 PG (ref 27–31)
MCHC RBC AUTO-ENTMCNC: 34 % (ref 32–36)
MCV RBC AUTO: 89 FL (ref 79–98)
MONOCYTES # BLD MANUAL: 0.6 K/UL (ref 0–1)
MONOCYTES # BLD MANUAL: 8.3 % (ref 1.7–9.3)
NEUTROPHILS # BLD AUTO: 4.8 K/UL (ref 1.8–7.7)
NEUTROPHILS NFR BLD AUTO: 69.7 % (ref 40–70)
PLATELET # BLD AUTO: 143 K/UL (ref 130–430)
POTASSIUM SERPL-SCNC: 3.8 MMOL/L (ref 3.5–5.1)
RBC # BLD AUTO: 3.62 MIL/UL (ref 4.2–6.2)
SODIUM SERPLBLD-SCNC: 136 MMOL/L (ref 136–145)
WBC # BLD AUTO: 6.9 K/UL (ref 4.8–10.8)

## 2019-11-07 RX ADMIN — Medication SCH EA: at 06:04

## 2019-11-07 RX ADMIN — SUCRALFATE SCH GM: 1 TABLET ORAL at 11:40

## 2019-11-07 RX ADMIN — ONDANSETRON PRN MG: 2 INJECTION INTRAMUSCULAR; INTRAVENOUS at 11:52

## 2019-11-07 RX ADMIN — SUCRALFATE SCH GM: 1 TABLET ORAL at 06:01

## 2019-11-07 RX ADMIN — DEXTROSE AND SODIUM CHLORIDE SCH MLS/HR: 5; 900 INJECTION, SOLUTION INTRAVENOUS at 05:11

## 2019-11-07 RX ADMIN — Medication SCH EA: at 11:40

## 2019-11-07 RX ADMIN — SODIUM CHLORIDE SCH MG: 9 INJECTION, SOLUTION INTRAVENOUS at 09:19

## 2019-11-07 NOTE — NUR
ROUND

Patient resting in the bed. No acute distress. No c/o nausea. IV intact, IVF infusing well. 
Safety measure maintained. Call light within reached. Bed locked in low position, side rails 
up. Continue to  monitor.

## 2019-11-07 NOTE — NUR
RN ROUNDS

Patient resting quietly, vital signs stable, IVF continues to infuse, safety measures in 
place, call light within reach, will monitor.

## 2019-11-07 NOTE — NUR
ZOFRAN GIVEN

Patient c/o nausea, Zofran 4mg IVP given as ordered. No acute distress. Safety measure 
maintained. Call light within reached. Bed locked in low position, side rails up. Continue 
to monitor.

## 2019-11-07 NOTE — NUR
RN ROUNDS

Patient resting quietly, respirations even and unlabored, safety measures in place, call 
light within reach, will monitor.

## 2019-11-07 NOTE — NUR
MED PASS

Patient awake, denies any nausea or vomiting, due medications administered, blood sugar 
check this am of 82, no insulin coverage needed, IV line remains intact and patent, IVF 
continues to infuse call light within reach, will monitor until report given to am nurse.

## 2019-11-07 NOTE — NUR
OPENING NOTE

Patient resting in the bed. No acute distress. AAO x 4. Denied of pain and nausea. Skin warm 
and dry to touch. IV intact to left hand, no redness, no swelling, no drainage. On D5 NS 
at30ml/hr, infusing well. Discussed the safety issue, use call light when needs help, and 
plan of care, verbally understanding. Safety measure maintained. Call light within reached. 
Bed locked in low position, side rails up. Refused bed alarm, risk and benefit explained, 
verbally understanding. Will continue to monitor.

## 2019-11-07 NOTE — NUR
RN ROUNDS

Patient sleeping, breathing is even and unlabored, IVF continues to infuse, call light 
within reach, safety measures in place, will monitor.

## 2019-11-07 NOTE — NUR
ROUND

Patient resting in the bed. No acute distress. IV intact, IVF infusing well. Safety measure 
maintained. Call light within reached. Bed locked in low position, side rails up. Continue 
to  monitor.

## 2019-11-10 LAB — ACTIN IGG SERPL-ACNC: 3 UNITS (ref 0–19)

## 2019-12-29 ENCOUNTER — HOSPITAL ENCOUNTER (EMERGENCY)
Dept: HOSPITAL 26 - MED | Age: 24
Discharge: HOME | End: 2019-12-29
Payer: COMMERCIAL

## 2019-12-29 VITALS — DIASTOLIC BLOOD PRESSURE: 57 MMHG | SYSTOLIC BLOOD PRESSURE: 109 MMHG

## 2019-12-29 VITALS — WEIGHT: 125.12 LBS | BODY MASS INDEX: 24.56 KG/M2 | HEIGHT: 60 IN

## 2019-12-29 VITALS — DIASTOLIC BLOOD PRESSURE: 98 MMHG | SYSTOLIC BLOOD PRESSURE: 106 MMHG

## 2019-12-29 DIAGNOSIS — R05: ICD-10-CM

## 2019-12-29 DIAGNOSIS — Z3A.18: ICD-10-CM

## 2019-12-29 DIAGNOSIS — Z79.899: ICD-10-CM

## 2019-12-29 DIAGNOSIS — R50.9: ICD-10-CM

## 2019-12-29 DIAGNOSIS — R09.81: ICD-10-CM

## 2019-12-29 DIAGNOSIS — O21.0: ICD-10-CM

## 2019-12-29 DIAGNOSIS — O99.512: ICD-10-CM

## 2019-12-29 DIAGNOSIS — O23.42: Primary | ICD-10-CM

## 2019-12-29 DIAGNOSIS — Z60.2: ICD-10-CM

## 2019-12-29 LAB
ANION GAP SERPL CALCULATED.3IONS-SCNC: 15.3 MMOL/L (ref 8–16)
APPEARANCE UR: (no result)
BASOPHILS # BLD AUTO: 0.1 K/UL (ref 0–0.22)
BASOPHILS NFR BLD AUTO: 0.8 % (ref 0–2)
BILIRUB UR QL STRIP: NEGATIVE
BUN SERPL-MCNC: 4 MG/DL (ref 7–18)
CHLORIDE SERPL-SCNC: 101 MMOL/L (ref 98–107)
CO2 SERPL-SCNC: 24 MMOL/L (ref 21–32)
COLOR UR: (no result)
CREAT SERPL-MCNC: 0.6 MG/DL (ref 0.6–1.3)
EOSINOPHIL # BLD AUTO: 0.1 K/UL (ref 0–0.4)
EOSINOPHIL NFR BLD AUTO: 0.6 % (ref 0–4)
ERYTHROCYTE [DISTWIDTH] IN BLOOD BY AUTOMATED COUNT: 13.9 % (ref 11.6–13.7)
GFR SERPL CREATININE-BSD FRML MDRD: 157 ML/MIN (ref 90–?)
GLUCOSE SERPL-MCNC: 83 MG/DL (ref 74–106)
GLUCOSE UR STRIP-MCNC: NEGATIVE MG/DL
HCT VFR BLD AUTO: 36 % (ref 36–48)
HGB BLD-MCNC: 12 G/DL (ref 12–16)
HGB UR QL STRIP: NEGATIVE
LEUKOCYTE ESTERASE UR QL STRIP: (no result)
LYMPHOCYTES # BLD AUTO: 1.7 K/UL (ref 2.5–16.5)
LYMPHOCYTES NFR BLD AUTO: 14.9 % (ref 20.5–51.1)
MCH RBC QN AUTO: 31 PG (ref 27–31)
MCHC RBC AUTO-ENTMCNC: 33 G/DL (ref 33–37)
MCV RBC AUTO: 91.7 FL (ref 80–94)
MONOCYTES # BLD AUTO: 0.6 K/UL (ref 0.8–1)
MONOCYTES NFR BLD AUTO: 5.4 % (ref 1.7–9.3)
NEUTROPHILS # BLD AUTO: 8.9 K/UL (ref 1.8–7.7)
NEUTROPHILS NFR BLD AUTO: 78.3 % (ref 42.2–75.2)
NITRITE UR QL STRIP: NEGATIVE
PH UR STRIP: 6 [PH] (ref 5–9)
PLATELET # BLD AUTO: 241 K/UL (ref 140–450)
POTASSIUM SERPL-SCNC: 3.3 MMOL/L (ref 3.5–5.1)
RBC # BLD AUTO: 3.93 MIL/UL (ref 4.2–5.4)
RBC #/AREA URNS HPF: (no result) /HPF (ref 0–5)
SODIUM SERPL-SCNC: 137 MMOL/L (ref 136–145)
WBC # BLD AUTO: 11.3 K/UL (ref 4.8–10.8)
WBC,URINE: (no result) /HPF (ref 0–5)

## 2019-12-29 PROCEDURE — 99284 EMERGENCY DEPT VISIT MOD MDM: CPT

## 2019-12-29 PROCEDURE — 81025 URINE PREGNANCY TEST: CPT

## 2019-12-29 PROCEDURE — 87086 URINE CULTURE/COLONY COUNT: CPT

## 2019-12-29 PROCEDURE — 86901 BLOOD TYPING SEROLOGIC RH(D): CPT

## 2019-12-29 PROCEDURE — 36415 COLL VENOUS BLD VENIPUNCTURE: CPT

## 2019-12-29 PROCEDURE — 86900 BLOOD TYPING SEROLOGIC ABO: CPT

## 2019-12-29 PROCEDURE — 85025 COMPLETE CBC W/AUTO DIFF WBC: CPT

## 2019-12-29 PROCEDURE — 76805 OB US >/= 14 WKS SNGL FETUS: CPT

## 2019-12-29 PROCEDURE — 80048 BASIC METABOLIC PNL TOTAL CA: CPT

## 2019-12-29 PROCEDURE — 96361 HYDRATE IV INFUSION ADD-ON: CPT

## 2019-12-29 PROCEDURE — 84702 CHORIONIC GONADOTROPIN TEST: CPT

## 2019-12-29 PROCEDURE — 81001 URINALYSIS AUTO W/SCOPE: CPT

## 2019-12-29 PROCEDURE — 96374 THER/PROPH/DIAG INJ IV PUSH: CPT

## 2019-12-29 SDOH — SOCIAL STABILITY - SOCIAL INSECURITY: PROBLEMS RELATED TO LIVING ALONE: Z60.2

## 2019-12-29 NOTE — NUR
25 Y/O FEMALE C/O VOMITING X 3 DAYS.  18 WEEKS PREGNANT. LMP 19. [id number]. 
A/OX4 FOLLOWS COMMANDS; BREATHING UNLABORED AND SYMMETRICAL. PAIN IS 5/10. 
PATIENT IS ABLE TO AMBULATE. NO NAUSEA AND VOMITING AT THIS TIME. ERMD MADE 
AWARE OF STATUS. SIDE RAILSX1. WILL CONTINUE TO MONITOR. 



MED HX: DENIES

RX: DENIES

NKDA

## 2020-03-06 ENCOUNTER — HOSPITAL ENCOUNTER (OUTPATIENT)
Dept: HOSPITAL 26 - MLB | Age: 25
Discharge: HOME | End: 2020-03-06
Payer: COMMERCIAL

## 2020-03-06 DIAGNOSIS — Z34.90: Primary | ICD-10-CM

## 2020-03-06 LAB
BASOPHILS # BLD AUTO: 0 K/UL (ref 0–0.22)
BASOPHILS NFR BLD AUTO: 0.5 % (ref 0–2)
EOSINOPHIL # BLD AUTO: 0.1 K/UL (ref 0–0.4)
EOSINOPHIL NFR BLD AUTO: 1.2 % (ref 0–4)
ERYTHROCYTE [DISTWIDTH] IN BLOOD BY AUTOMATED COUNT: 13.7 % (ref 11.6–13.7)
GLUCOSE P FAST SERPL-MCNC: 72 MG/DL (ref 83–110)
HCT VFR BLD AUTO: 29.4 % (ref 36–48)
HGB BLD-MCNC: 9.6 G/DL (ref 12–16)
LYMPHOCYTES # BLD AUTO: 1.5 K/UL (ref 2.5–16.5)
LYMPHOCYTES NFR BLD AUTO: 14.8 % (ref 20.5–51.1)
Lab: 103 MG/DL (ref 76–139)
MCH RBC QN AUTO: 28 PG (ref 27–31)
MCHC RBC AUTO-ENTMCNC: 33 G/DL (ref 33–37)
MCV RBC AUTO: 87.1 FL (ref 80–94)
MONOCYTES # BLD AUTO: 0.5 K/UL (ref 0.8–1)
MONOCYTES NFR BLD AUTO: 5 % (ref 1.7–9.3)
NEUTROPHILS # BLD AUTO: 7.9 K/UL (ref 1.8–7.7)
NEUTROPHILS NFR BLD AUTO: 78.5 % (ref 42.2–75.2)
PLATELET # BLD AUTO: 209 K/UL (ref 140–450)
RBC # BLD AUTO: 3.37 MIL/UL (ref 4.2–5.4)
WBC # BLD AUTO: 10 K/UL (ref 4.8–10.8)

## 2020-09-28 ENCOUNTER — HOSPITAL ENCOUNTER (EMERGENCY)
Dept: HOSPITAL 26 - MED | Age: 25
Discharge: HOME | End: 2020-09-28
Payer: COMMERCIAL

## 2020-09-28 VITALS — DIASTOLIC BLOOD PRESSURE: 80 MMHG | SYSTOLIC BLOOD PRESSURE: 124 MMHG

## 2020-09-28 VITALS — SYSTOLIC BLOOD PRESSURE: 120 MMHG | DIASTOLIC BLOOD PRESSURE: 74 MMHG

## 2020-09-28 VITALS — WEIGHT: 140 LBS | BODY MASS INDEX: 27.48 KG/M2 | HEIGHT: 60 IN

## 2020-09-28 DIAGNOSIS — U07.1: Primary | ICD-10-CM

## 2020-09-28 DIAGNOSIS — Z79.899: ICD-10-CM

## 2020-09-28 PROCEDURE — 87804 INFLUENZA ASSAY W/OPTIC: CPT

## 2020-09-28 PROCEDURE — 99283 EMERGENCY DEPT VISIT LOW MDM: CPT

## 2020-09-28 RX ADMIN — ACETAMINOPHEN ONE MG: 500 TABLET ORAL at 11:37

## 2020-09-28 RX ADMIN — IBUPROFEN ONE MG: 600 TABLET ORAL at 12:14

## 2020-09-28 NOTE — NUR
25/F BIB FATHER C/O FEVER, COUGH,HEADACHE X3 DAYS. TEMP 102.9 , P 108, R 22, O2 
SAT 97% AT THIS TIME.MED HX: DENIES. DENIES CONTACT TO PERSON WHO HAS COVID +. 
DENIES N/V/D; SKIN IS PINK/WARM/DRY; AAOX4 WITH EVEN AND STEADY GAIT; LUNGS 
CLEAR BL; HR EVEN AND REGULAR; PT DENIES ANY CP, SOB AT THIS TIME; PATIENT 
STATES PAIN OF 5/10 AT THIS TIME.

## 2020-09-28 NOTE — NUR
Patient discharged with v/s stable. Written and verbal after care instructions 
given and explained. 

Patient alert, oriented and verbalized understanding of instructions. 
Ambulatory with steady gait. All questions addressed prior to discharge. ID 
band removed. Patient advised to follow up with PMD. Rx of Ibuprofen 600mg 
given. Patient educated on indication of medication including possible reaction 
and side effects. Opportunity to ask questions provided and answered.

## 2020-09-30 NOTE — NUR
+covid result received from lab, copy to be sent to Francheska at infection control 
and house supervisor

## 2023-10-03 ENCOUNTER — HOSPITAL ENCOUNTER (EMERGENCY)
Dept: HOSPITAL 26 - MED | Age: 28
Discharge: HOME | End: 2023-10-03
Payer: COMMERCIAL

## 2023-10-03 VITALS — WEIGHT: 145 LBS | HEIGHT: 62 IN | BODY MASS INDEX: 26.68 KG/M2

## 2023-10-03 VITALS
SYSTOLIC BLOOD PRESSURE: 122 MMHG | RESPIRATION RATE: 17 BRPM | TEMPERATURE: 97.4 F | HEART RATE: 70 BPM | OXYGEN SATURATION: 98 % | DIASTOLIC BLOOD PRESSURE: 78 MMHG
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
## 2023-10-03 DIAGNOSIS — Z79.899: ICD-10-CM

## 2023-10-03 DIAGNOSIS — S93.602A: Primary | ICD-10-CM
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
